# Patient Record
Sex: FEMALE | Race: WHITE | Employment: OTHER | URBAN - METROPOLITAN AREA
[De-identification: names, ages, dates, MRNs, and addresses within clinical notes are randomized per-mention and may not be internally consistent; named-entity substitution may affect disease eponyms.]

---

## 2017-04-10 PROBLEM — I48.91 ATRIAL FIBRILLATION WITH RVR (HCC): Status: ACTIVE | Noted: 2017-04-10

## 2017-04-10 PROBLEM — C78.7 HEPATIC METASTASES (HCC): Status: ACTIVE | Noted: 2017-04-10

## 2017-08-10 ENCOUNTER — HOSPITAL ENCOUNTER (OUTPATIENT)
Dept: ULTRASOUND IMAGING | Age: 82
Discharge: HOME | End: 2017-08-10
Attending: INTERNAL MEDICINE | Admitting: INTERNAL MEDICINE

## 2017-08-10 DIAGNOSIS — R10.30 LOWER ABDOMINAL PAIN: ICD-10-CM

## 2017-08-10 DIAGNOSIS — R10.9 ABDOMINAL PAIN, UNSPECIFIED LOCATION: ICD-10-CM

## 2017-08-23 ENCOUNTER — HOSPITAL ENCOUNTER (OUTPATIENT)
Dept: OTHER | Age: 82
Discharge: HOME | End: 2017-08-23
Attending: INTERNAL MEDICINE | Admitting: INTERNAL MEDICINE

## 2017-08-23 LAB
ALBUMIN SERPL-MCNC: 2.6 G/DL (ref 3.4–5)
ALP BLD-CCNC: 97 U/L (ref 40–129)
ALT SERPL-CCNC: 7 U/L (ref 10–40)
ANION GAP SERPL CALCULATED.3IONS-SCNC: 16 MMOL/L (ref 3–16)
AST SERPL-CCNC: 9 U/L (ref 15–37)
BILIRUB SERPL-MCNC: 0.4 MG/DL (ref 0–1)
BILIRUBIN DIRECT: <0.2 MG/DL (ref 0–0.3)
BILIRUBIN, INDIRECT: ABNORMAL MG/DL (ref 0–1)
BUN BLDV-MCNC: 18 MG/DL (ref 7–20)
CALCIUM SERPL-MCNC: 9.4 MG/DL (ref 8.3–10.6)
CHLORIDE BLD-SCNC: 95 MMOL/L (ref 99–110)
CO2: 22 MMOL/L (ref 21–32)
CREAT SERPL-MCNC: 0.9 MG/DL (ref 0.6–1.2)
GFR AFRICAN AMERICAN: >60
GFR NON-AFRICAN AMERICAN: 59
GLUCOSE BLD-MCNC: 248 MG/DL (ref 70–99)
POTASSIUM SERPL-SCNC: 4.5 MMOL/L (ref 3.5–5.1)
SODIUM BLD-SCNC: 133 MMOL/L (ref 136–145)
TOTAL PROTEIN: 7 G/DL (ref 6.4–8.2)

## 2017-08-31 ENCOUNTER — HOSPITAL ENCOUNTER (OUTPATIENT)
Dept: CT IMAGING | Age: 82
Discharge: HOME | End: 2017-08-31
Attending: INTERNAL MEDICINE | Admitting: INTERNAL MEDICINE

## 2017-08-31 DIAGNOSIS — R10.11 ABDOMINAL PAIN, RIGHT UPPER QUADRANT: ICD-10-CM

## 2017-08-31 DIAGNOSIS — R10.11 RIGHT UPPER QUADRANT PAIN: ICD-10-CM

## 2018-05-22 PROBLEM — I48.91 ATRIAL FIBRILLATION (HCC): Status: ACTIVE | Noted: 2018-05-22

## 2018-05-24 PROBLEM — I47.1 MULTIFOCAL ATRIAL TACHYCARDIA (HCC): Status: ACTIVE | Noted: 2018-05-24

## 2018-05-24 PROBLEM — I48.0 PAROXYSMAL ATRIAL FIBRILLATION (HCC): Status: ACTIVE | Noted: 2018-05-24

## 2018-05-24 PROBLEM — E87.5 HYPERKALEMIA: Status: ACTIVE | Noted: 2018-05-24

## 2018-05-24 PROBLEM — I10 ESSENTIAL HYPERTENSION: Status: ACTIVE | Noted: 2018-05-24

## 2018-05-24 LAB
LEFT VENTRICULAR EJECTION FRACTION HIGH VALUE: 50 %
LEFT VENTRICULAR EJECTION FRACTION MODE: NORMAL
LV EF: 45 %

## 2018-06-08 ENCOUNTER — TELEPHONE (OUTPATIENT)
Dept: CARDIOLOGY CLINIC | Age: 83
End: 2018-06-08

## 2018-06-08 PROBLEM — I50.43 ACUTE ON CHRONIC COMBINED SYSTOLIC AND DIASTOLIC CHF, NYHA CLASS 3 (HCC): Status: ACTIVE | Noted: 2018-06-08

## 2018-06-08 PROBLEM — I50.33 ACUTE ON CHRONIC DIASTOLIC CHF (CONGESTIVE HEART FAILURE), NYHA CLASS 3 (HCC): Status: ACTIVE | Noted: 2018-06-08

## 2018-06-08 PROBLEM — I48.91 ATRIAL FIBRILLATION WITH RVR (HCC): Chronic | Status: ACTIVE | Noted: 2018-06-08

## 2018-06-11 PROBLEM — G47.33 OSA ON CPAP: Status: ACTIVE | Noted: 2018-06-11

## 2018-06-11 PROBLEM — J96.01 ACUTE RESPIRATORY FAILURE WITH HYPOXEMIA (HCC): Status: ACTIVE | Noted: 2018-06-11

## 2018-06-11 PROBLEM — Z99.89 OSA ON CPAP: Status: ACTIVE | Noted: 2018-06-11

## 2018-06-11 PROBLEM — J84.9 ILD (INTERSTITIAL LUNG DISEASE) (HCC): Status: ACTIVE | Noted: 2018-06-11

## 2018-06-11 PROBLEM — I27.20 PULMONARY HTN (HCC): Status: ACTIVE | Noted: 2018-06-11

## 2018-06-16 ENCOUNTER — CARE COORDINATION (OUTPATIENT)
Dept: CASE MANAGEMENT | Age: 83
End: 2018-06-16

## 2018-06-18 ENCOUNTER — CARE COORDINATION (OUTPATIENT)
Dept: CASE MANAGEMENT | Age: 83
End: 2018-06-18

## 2018-06-19 ENCOUNTER — OFFICE VISIT (OUTPATIENT)
Dept: INTERNAL MEDICINE CLINIC | Age: 83
End: 2018-06-19

## 2018-06-19 ENCOUNTER — TELEPHONE (OUTPATIENT)
Dept: INTERNAL MEDICINE CLINIC | Age: 83
End: 2018-06-19

## 2018-06-19 VITALS — HEART RATE: 57 BPM | DIASTOLIC BLOOD PRESSURE: 80 MMHG | OXYGEN SATURATION: 92 % | SYSTOLIC BLOOD PRESSURE: 124 MMHG

## 2018-06-19 DIAGNOSIS — G47.33 OSA ON CPAP: ICD-10-CM

## 2018-06-19 DIAGNOSIS — G89.29 CHRONIC BILATERAL LOW BACK PAIN WITHOUT SCIATICA: ICD-10-CM

## 2018-06-19 DIAGNOSIS — Z79.4 TYPE 2 DIABETES MELLITUS WITH HYPERGLYCEMIA, WITH LONG-TERM CURRENT USE OF INSULIN (HCC): ICD-10-CM

## 2018-06-19 DIAGNOSIS — Z99.89 OSA ON CPAP: ICD-10-CM

## 2018-06-19 DIAGNOSIS — I50.43 ACUTE ON CHRONIC COMBINED SYSTOLIC AND DIASTOLIC CHF, NYHA CLASS 3 (HCC): ICD-10-CM

## 2018-06-19 DIAGNOSIS — J84.9 ILD (INTERSTITIAL LUNG DISEASE) (HCC): ICD-10-CM

## 2018-06-19 DIAGNOSIS — M54.50 CHRONIC BILATERAL LOW BACK PAIN WITHOUT SCIATICA: ICD-10-CM

## 2018-06-19 DIAGNOSIS — E11.65 TYPE 2 DIABETES MELLITUS WITH HYPERGLYCEMIA, WITH LONG-TERM CURRENT USE OF INSULIN (HCC): ICD-10-CM

## 2018-06-19 DIAGNOSIS — I48.19 PERSISTENT ATRIAL FIBRILLATION (HCC): Primary | ICD-10-CM

## 2018-06-19 DIAGNOSIS — I10 ESSENTIAL HYPERTENSION: ICD-10-CM

## 2018-06-19 PROCEDURE — G8427 DOCREV CUR MEDS BY ELIG CLIN: HCPCS | Performed by: INTERNAL MEDICINE

## 2018-06-19 PROCEDURE — 1111F DSCHRG MED/CURRENT MED MERGE: CPT | Performed by: INTERNAL MEDICINE

## 2018-06-19 PROCEDURE — 4040F PNEUMOC VAC/ADMIN/RCVD: CPT | Performed by: INTERNAL MEDICINE

## 2018-06-19 PROCEDURE — G8510 SCR DEP NEG, NO PLAN REQD: HCPCS | Performed by: INTERNAL MEDICINE

## 2018-06-19 PROCEDURE — 99204 OFFICE O/P NEW MOD 45 MIN: CPT | Performed by: INTERNAL MEDICINE

## 2018-06-19 PROCEDURE — G8417 CALC BMI ABV UP PARAM F/U: HCPCS | Performed by: INTERNAL MEDICINE

## 2018-06-19 PROCEDURE — 1123F ACP DISCUSS/DSCN MKR DOCD: CPT | Performed by: INTERNAL MEDICINE

## 2018-06-19 PROCEDURE — 1036F TOBACCO NON-USER: CPT | Performed by: INTERNAL MEDICINE

## 2018-06-19 PROCEDURE — 1090F PRES/ABSN URINE INCON ASSESS: CPT | Performed by: INTERNAL MEDICINE

## 2018-06-19 ASSESSMENT — ENCOUNTER SYMPTOMS
ABDOMINAL DISTENTION: 0
NAUSEA: 0
DIARRHEA: 0
VOMITING: 0
SHORTNESS OF BREATH: 1
CHEST TIGHTNESS: 0
CONSTIPATION: 0
COUGH: 0
WHEEZING: 0
BACK PAIN: 0

## 2018-06-19 ASSESSMENT — PATIENT HEALTH QUESTIONNAIRE - PHQ9
SUM OF ALL RESPONSES TO PHQ QUESTIONS 1-9: 0
2. FEELING DOWN, DEPRESSED OR HOPELESS: 0
SUM OF ALL RESPONSES TO PHQ9 QUESTIONS 1 & 2: 0
1. LITTLE INTEREST OR PLEASURE IN DOING THINGS: 0

## 2018-06-20 ENCOUNTER — CARE COORDINATION (OUTPATIENT)
Dept: CASE MANAGEMENT | Age: 83
End: 2018-06-20

## 2018-06-22 ENCOUNTER — HOSPITAL ENCOUNTER (OUTPATIENT)
Dept: OTHER | Age: 83
Discharge: HOME | End: 2018-06-30
Attending: INTERNAL MEDICINE | Admitting: INTERNAL MEDICINE

## 2018-06-22 ENCOUNTER — TELEPHONE (OUTPATIENT)
Dept: INTERNAL MEDICINE CLINIC | Age: 83
End: 2018-06-22

## 2018-06-22 LAB
ANION GAP SERPL CALCULATED.3IONS-SCNC: 8 MMOL/L (ref 3–16)
BUN BLDV-MCNC: 21 MG/DL (ref 7–20)
CALCIUM SERPL-MCNC: 9.8 MG/DL (ref 8.3–10.6)
CHLORIDE BLD-SCNC: 94 MMOL/L (ref 99–110)
CO2: 36 MMOL/L (ref 21–32)
CREAT SERPL-MCNC: 1.2 MG/DL (ref 0.6–1.2)
GFR AFRICAN AMERICAN: 51
GFR NON-AFRICAN AMERICAN: 42
GLUCOSE BLD-MCNC: 233 MG/DL (ref 70–99)
POTASSIUM SERPL-SCNC: 4.1 MMOL/L (ref 3.5–5.1)
PRO-BNP: 2977 PG/ML (ref 0–449)
SODIUM BLD-SCNC: 138 MMOL/L (ref 136–145)

## 2018-06-25 ENCOUNTER — OFFICE VISIT (OUTPATIENT)
Dept: CARDIOLOGY CLINIC | Age: 83
End: 2018-06-25

## 2018-06-25 VITALS
BODY MASS INDEX: 35.44 KG/M2 | SYSTOLIC BLOOD PRESSURE: 94 MMHG | DIASTOLIC BLOOD PRESSURE: 58 MMHG | HEART RATE: 62 BPM | WEIGHT: 200 LBS | HEIGHT: 63 IN

## 2018-06-25 DIAGNOSIS — I47.1 MULTIFOCAL ATRIAL TACHYCARDIA (HCC): ICD-10-CM

## 2018-06-25 DIAGNOSIS — I10 ESSENTIAL HYPERTENSION: ICD-10-CM

## 2018-06-25 DIAGNOSIS — I50.42 CHRONIC COMBINED SYSTOLIC AND DIASTOLIC CONGESTIVE HEART FAILURE (HCC): ICD-10-CM

## 2018-06-25 DIAGNOSIS — I48.19 PERSISTENT ATRIAL FIBRILLATION (HCC): Primary | ICD-10-CM

## 2018-06-25 PROCEDURE — 99214 OFFICE O/P EST MOD 30 MIN: CPT | Performed by: NURSE PRACTITIONER

## 2018-06-25 PROCEDURE — G8427 DOCREV CUR MEDS BY ELIG CLIN: HCPCS | Performed by: NURSE PRACTITIONER

## 2018-06-25 PROCEDURE — 1111F DSCHRG MED/CURRENT MED MERGE: CPT | Performed by: NURSE PRACTITIONER

## 2018-06-25 PROCEDURE — 1090F PRES/ABSN URINE INCON ASSESS: CPT | Performed by: NURSE PRACTITIONER

## 2018-06-25 PROCEDURE — 1036F TOBACCO NON-USER: CPT | Performed by: NURSE PRACTITIONER

## 2018-06-25 PROCEDURE — 1123F ACP DISCUSS/DSCN MKR DOCD: CPT | Performed by: NURSE PRACTITIONER

## 2018-06-25 PROCEDURE — 4040F PNEUMOC VAC/ADMIN/RCVD: CPT | Performed by: NURSE PRACTITIONER

## 2018-06-25 PROCEDURE — 93000 ELECTROCARDIOGRAM COMPLETE: CPT | Performed by: NURSE PRACTITIONER

## 2018-06-25 PROCEDURE — G8417 CALC BMI ABV UP PARAM F/U: HCPCS | Performed by: NURSE PRACTITIONER

## 2018-06-25 RX ORDER — FUROSEMIDE 40 MG/1
40 TABLET ORAL DAILY
Qty: 60 TABLET | Refills: 0
Start: 2018-06-25 | End: 2018-08-01 | Stop reason: SDUPTHER

## 2018-06-25 RX ORDER — METOPROLOL SUCCINATE 50 MG/1
50 TABLET, EXTENDED RELEASE ORAL DAILY
Qty: 30 TABLET | Refills: 5 | Status: SHIPPED | OUTPATIENT
Start: 2018-06-25 | End: 2018-07-07

## 2018-06-26 ENCOUNTER — CARE COORDINATION (OUTPATIENT)
Dept: CASE MANAGEMENT | Age: 83
End: 2018-06-26

## 2018-06-27 ENCOUNTER — TELEPHONE (OUTPATIENT)
Dept: CARDIOLOGY CLINIC | Age: 83
End: 2018-06-27

## 2018-07-01 ENCOUNTER — HOSPITAL ENCOUNTER (OUTPATIENT)
Dept: OTHER | Age: 83
Discharge: HOME | End: 2018-07-01
Attending: INTERNAL MEDICINE | Admitting: INTERNAL MEDICINE

## 2018-07-02 RX ORDER — DILTIAZEM HYDROCHLORIDE 240 MG/1
240 CAPSULE, COATED, EXTENDED RELEASE ORAL DAILY
Qty: 30 CAPSULE | Refills: 0 | Status: SHIPPED | OUTPATIENT
Start: 2018-07-02 | End: 2018-07-03 | Stop reason: SDUPTHER

## 2018-07-02 RX ORDER — DIGOXIN 125 MCG
125 TABLET ORAL DAILY
Qty: 30 TABLET | Refills: 0 | Status: SHIPPED | OUTPATIENT
Start: 2018-07-02 | End: 2018-07-03 | Stop reason: SDUPTHER

## 2018-07-03 ENCOUNTER — TELEPHONE (OUTPATIENT)
Dept: INTERNAL MEDICINE CLINIC | Age: 83
End: 2018-07-03

## 2018-07-03 RX ORDER — DIGOXIN 125 MCG
125 TABLET ORAL DAILY
Qty: 30 TABLET | Refills: 0 | Status: SHIPPED | OUTPATIENT
Start: 2018-07-03 | End: 2018-07-07

## 2018-07-03 RX ORDER — DILTIAZEM HYDROCHLORIDE 240 MG/1
240 CAPSULE, COATED, EXTENDED RELEASE ORAL DAILY
Qty: 30 CAPSULE | Refills: 0 | Status: ON HOLD | OUTPATIENT
Start: 2018-07-03 | End: 2018-07-10 | Stop reason: SDUPTHER

## 2018-07-03 NOTE — TELEPHONE ENCOUNTER
Last 3 Rx filled were sent to [facility] pharmacy in error - [facility]  (they were prescribed while patient was in the hospital. )   Please re-send.  Xarelto, Cardizem and digoxin

## 2018-07-06 ENCOUNTER — TELEPHONE (OUTPATIENT)
Dept: CARDIOLOGY CLINIC | Age: 83
End: 2018-07-06

## 2018-07-06 NOTE — TELEPHONE ENCOUNTER
Shanice Vilchis in house right now and will draw requested labs. They have been drawing BNP and BMP weekly (last results in [facility] 6/29/18). Should the patient continue to hold Digoxin until resulted? I also informed daughter she should speak to PCP about possible UTI. Voiced understanding to all.

## 2018-07-07 ENCOUNTER — TELEPHONE (OUTPATIENT)
Dept: OTHER | Facility: CLINIC | Age: 83
End: 2018-07-07

## 2018-07-07 PROBLEM — J81.0 ACUTE PULMONARY EDEMA (HCC): Status: ACTIVE | Noted: 2018-07-07

## 2018-07-07 PROBLEM — I50.1 PULMONARY EDEMA CARDIAC CAUSE (HCC): Status: ACTIVE | Noted: 2018-07-07

## 2018-07-07 NOTE — TELEPHONE ENCOUNTER
Writer contacted MD Alma Mendoza ,ED provider to inform of 30 day readmission risk. ED provider informed writer admit or discharge has not been determined but probably will admit. Continue to follow-up.

## 2018-07-09 ENCOUNTER — TELEPHONE (OUTPATIENT)
Dept: INTERNAL MEDICINE CLINIC | Age: 83
End: 2018-07-09

## 2018-07-10 ENCOUNTER — TELEPHONE (OUTPATIENT)
Dept: PULMONOLOGY | Age: 83
End: 2018-07-10

## 2018-07-10 ENCOUNTER — TELEPHONE (OUTPATIENT)
Dept: INTERNAL MEDICINE CLINIC | Age: 83
End: 2018-07-10

## 2018-07-10 NOTE — TELEPHONE ENCOUNTER
Refill request for CARTIA XT  medication.      Name of Pharmacy- [address] [place] [address]      Last visit - 6/19/18     Pending visit - 7/23/18    Last refill -7/3/18      Medication Contract signed -   Last Oarrs ran-         Additional Comments

## 2018-07-10 NOTE — TELEPHONE ENCOUNTER
[facility] [place] calling to have Dr. Loy Dyer sign 3 orders for [facility] and fax to her.     Fax:  [phone]    Phone:  [phone]

## 2018-07-11 ENCOUNTER — TELEPHONE (OUTPATIENT)
Dept: INTERNAL MEDICINE CLINIC | Age: 83
End: 2018-07-11

## 2018-07-11 ENCOUNTER — TELEPHONE (OUTPATIENT)
Dept: OTHER | Facility: CLINIC | Age: 83
End: 2018-07-11

## 2018-07-11 PROBLEM — N39.0 UTI (URINARY TRACT INFECTION): Status: ACTIVE | Noted: 2018-07-11

## 2018-07-11 RX ORDER — DILTIAZEM HYDROCHLORIDE 240 MG/1
240 CAPSULE, EXTENDED RELEASE ORAL DAILY
Qty: 30 CAPSULE | Refills: 0 | Status: SHIPPED | OUTPATIENT
Start: 2018-07-11 | End: 2018-10-06 | Stop reason: SDUPTHER

## 2018-07-11 NOTE — TELEPHONE ENCOUNTER
Handler calling about the order to be signed by Dr. Germain Ragsdale on this patient. Request was made yesterday and may have been faxed to her office; however, they are having fax machine issues. Can this be faxed again, if necessary. Fax:  [phone].

## 2018-07-11 NOTE — TELEPHONE ENCOUNTER
Writer contacted Dr. Nani Diaz ,ED provider to inform of 30 day readmission risk. Dr. Nani Diaz states that pt will be readmitted.

## 2018-07-12 PROBLEM — E11.9 DM (DIABETES MELLITUS) (HCC): Status: ACTIVE | Noted: 2018-07-12

## 2018-07-12 PROBLEM — R41.82 ALTERED MENTAL STATUS: Status: ACTIVE | Noted: 2018-07-12

## 2018-07-14 ENCOUNTER — CARE COORDINATION (OUTPATIENT)
Dept: CASE MANAGEMENT | Age: 83
End: 2018-07-14

## 2018-07-14 DIAGNOSIS — I47.1 MULTIFOCAL ATRIAL TACHYCARDIA (HCC): Primary | ICD-10-CM

## 2018-07-14 PROCEDURE — 1111F DSCHRG MED/CURRENT MED MERGE: CPT

## 2018-07-18 ENCOUNTER — CARE COORDINATION (OUTPATIENT)
Dept: CASE MANAGEMENT | Age: 83
End: 2018-07-18

## 2018-07-23 ENCOUNTER — OFFICE VISIT (OUTPATIENT)
Dept: INTERNAL MEDICINE CLINIC | Age: 83
End: 2018-07-23

## 2018-07-23 VITALS
WEIGHT: 196 LBS | BODY MASS INDEX: 34.72 KG/M2 | HEART RATE: 82 BPM | SYSTOLIC BLOOD PRESSURE: 124 MMHG | DIASTOLIC BLOOD PRESSURE: 80 MMHG | OXYGEN SATURATION: 90 %

## 2018-07-23 DIAGNOSIS — I48.19 PERSISTENT ATRIAL FIBRILLATION (HCC): ICD-10-CM

## 2018-07-23 DIAGNOSIS — E11.9 TYPE 2 DIABETES MELLITUS WITHOUT COMPLICATION, WITH LONG-TERM CURRENT USE OF INSULIN (HCC): ICD-10-CM

## 2018-07-23 DIAGNOSIS — Z79.4 TYPE 2 DIABETES MELLITUS WITHOUT COMPLICATION, WITH LONG-TERM CURRENT USE OF INSULIN (HCC): ICD-10-CM

## 2018-07-23 DIAGNOSIS — J84.9 ILD (INTERSTITIAL LUNG DISEASE) (HCC): ICD-10-CM

## 2018-07-23 DIAGNOSIS — R41.82 ALTERED MENTAL STATUS, UNSPECIFIED ALTERED MENTAL STATUS TYPE: ICD-10-CM

## 2018-07-23 DIAGNOSIS — N30.00 ACUTE CYSTITIS WITHOUT HEMATURIA: Primary | ICD-10-CM

## 2018-07-23 LAB
BILIRUBIN, POC: NORMAL
BLOOD URINE, POC: NORMAL
CLARITY, POC: CLEAR
COLOR, POC: YELLOW
GLUCOSE URINE, POC: NORMAL
KETONES, POC: NORMAL
LEUKOCYTE EST, POC: NORMAL
NITRITE, POC: NORMAL
PH, POC: 5
PROTEIN, POC: NORMAL
SPECIFIC GRAVITY, POC: 1.01
UROBILINOGEN, POC: 0.2

## 2018-07-23 PROCEDURE — 81002 URINALYSIS NONAUTO W/O SCOPE: CPT | Performed by: INTERNAL MEDICINE

## 2018-07-23 PROCEDURE — 1111F DSCHRG MED/CURRENT MED MERGE: CPT | Performed by: INTERNAL MEDICINE

## 2018-07-23 PROCEDURE — 99495 TRANSJ CARE MGMT MOD F2F 14D: CPT | Performed by: INTERNAL MEDICINE

## 2018-07-23 NOTE — PROGRESS NOTES
Subjective:      Patient ID: Sherley Hector is a 80 y.o. female.     HPI    Review of Systems    Objective:   Physical Exam    Assessment:            Plan:               Post-Discharge Transitional Care Management Services      Sherley Hector   YOB: 1930    Date of Office Visit:  7/23/2018  Date of Hospital Admission: 7/11/18  Date of Hospital Discharge: 7/13/18  [facility] Risk Score [risk of hospital readmission >=10  medium risk (chance of readmission ~ 12%) >14  high risk (chance of readmission ~18%)]:Readmission Risk Score: 28    Care management risk score Rising risk (score 2-5) and Complex Care (Scores >=6): 10       Patient Active Problem List   Diagnosis    Multifocal atrial tachycardia ([facility]    Hyperkalemia    Essential hypertension    Chronic combined systolic and diastolic congestive heart failure (HCC)    Persistent atrial fibrillation (HCC)    Dyspnea and respiratory abnormalities    Acute respiratory failure with hypoxemia (HCC)    ILD (interstitial lung disease) ([address]    Pulmonary HTN    KATIANA on CPAP    Hyperglycemia    Hypoxia    Acute pulmonary edema (HCC)    UTI (urinary tract infection)    Altered mental status    DM (diabetes mellitus) ([address]       No Known Allergies    Medications listed as ordered at the time of discharge from [facility] Medication Instructions PAIGE:    Printed on:07/23/18 1018   Medication Information                      allopurinol (ZYLOPRIM) 100 MG tablet  Take 100 mg by mouth daily             CALCIUM PO  Take by mouth             CARTIA  MG extended release capsule  TAKE 1 CAPSULE BY MOUTH DAILY             CPAP Machine MISC  by Does not apply route nightly             furosemide (LASIX) 40 MG tablet  Take 1 tablet by mouth daily             Handicap Placard MISC  by Does not apply route Unable to walk distance, expiration 5 years             LANTUS SOLOSTAR 100 UNIT/ML injection pen  Inject 5 Units into

## 2018-07-24 ENCOUNTER — CARE COORDINATION (OUTPATIENT)
Dept: CASE MANAGEMENT | Age: 83
End: 2018-07-24

## 2018-07-24 NOTE — CARE COORDINATION
[facility] Transitions Follow Up Call    2018    Patient: Luma Ellsworth  Patient : 1930   MRN: [id number]  Reason for Admission: UTI  Discharge Date: 18 RARS: Readmission Risk Score: 28       Attempted to reach patient via phone for care transition, no answer. VM left stating purpose of call along with my contact information requesting a return call.             Follow Up  Future Appointments  Date Time Provider Natalio Bueno   2018 11:00 AM PAM Scott - CNP Santiago Hector [facility]   2018 11:20 AM Fam Alan MD AND PULM [facility]   2019 10:15 AM MD JOHANNA Adame AND LILY Beckford RN

## 2018-07-25 NOTE — CARE COORDINATION
[facility] Transitions Follow Up Call    2018    Patient: Jose Manuel Young  Patient : 1930   MRN: [id number]  Reason for Admission: UTI   Discharge Date: 18 RARS: Readmission Risk Score: 29       Spoke with: patient's daughter     Care Transitions Subsequent and Final Call    Subsequent and Final Calls  Do you have any ongoing symptoms?:  No  Have your medications changed?:  No  Do you have any questions related to your medications?:  No  Do you currently have any active services?:  Yes  Are you currently active with any services?:  Home Health  Do you have any needs or concerns that I can assist you with?:  No  Identified Barriers:  None  Care Transitions Interventions     Other Services:  Declined (Comment: SNF)    Palliative Care:  Completed     Other Interventions:        Spoke with patient's daughter who stated patient is doing very well. Per daughter [facility] made final visit today and has discharge patient. Patient's appetite has improved and she is getting around well. Patient had apt with PCP on Monday and everything went well. Patient's daughter denied any further needs at this time. CTC informed her of final call. Reminded patient that if they have any questions/concerns at anytime, they can always utilize CTC or call PCP/specialist as they have an MD on call . Advised patient that they can always contact their home care provider to request a nurse visit. Educated patient that they can have a home care nurse visit even if it isn't their already scheduled day.        Follow Up  Future Appointments  Date Time Provider Natalio Bueno   2018 11:00 AM PAM Holder - KEILY SPENCER   2018 11:20 AM Mima Machaod MD AND PULM [facility]   2019 10:15 AM Александр Briones MD [facility] AND LILY Hubbard, RN

## 2018-08-01 ENCOUNTER — TELEPHONE (OUTPATIENT)
Dept: INTERNAL MEDICINE CLINIC | Age: 83
End: 2018-08-01

## 2018-08-01 ENCOUNTER — OFFICE VISIT (OUTPATIENT)
Dept: CARDIOLOGY CLINIC | Age: 83
End: 2018-08-01

## 2018-08-01 ENCOUNTER — HOSPITAL ENCOUNTER (OUTPATIENT)
Age: 83
Discharge: HOME | End: 2018-08-01
Payer: MEDICARE

## 2018-08-01 VITALS
HEART RATE: 75 BPM | SYSTOLIC BLOOD PRESSURE: 88 MMHG | WEIGHT: 196.38 LBS | DIASTOLIC BLOOD PRESSURE: 50 MMHG | BODY MASS INDEX: 36.14 KG/M2 | OXYGEN SATURATION: 93 % | HEIGHT: 62 IN

## 2018-08-01 DIAGNOSIS — I48.19 PERSISTENT ATRIAL FIBRILLATION (HCC): ICD-10-CM

## 2018-08-01 DIAGNOSIS — L29.9 ITCHING: ICD-10-CM

## 2018-08-01 DIAGNOSIS — J96.11 CHRONIC RESPIRATORY FAILURE WITH HYPOXIA (HCC): ICD-10-CM

## 2018-08-01 DIAGNOSIS — I50.42 CHRONIC COMBINED SYSTOLIC AND DIASTOLIC CONGESTIVE HEART FAILURE (HCC): ICD-10-CM

## 2018-08-01 DIAGNOSIS — K92.1 MELENA: ICD-10-CM

## 2018-08-01 DIAGNOSIS — I50.42 CHRONIC COMBINED SYSTOLIC AND DIASTOLIC CONGESTIVE HEART FAILURE (HCC): Primary | ICD-10-CM

## 2018-08-01 DIAGNOSIS — I10 ESSENTIAL HYPERTENSION: ICD-10-CM

## 2018-08-01 LAB
ANION GAP SERPL CALCULATED.3IONS-SCNC: 11 MMOL/L (ref 3–16)
BUN BLDV-MCNC: 12 MG/DL (ref 7–20)
CALCIUM SERPL-MCNC: 9.5 MG/DL (ref 8.3–10.6)
CHLORIDE BLD-SCNC: 98 MMOL/L (ref 99–110)
CO2: 30 MMOL/L (ref 21–32)
CREAT SERPL-MCNC: 1.3 MG/DL (ref 0.6–1.2)
GFR AFRICAN AMERICAN: 47
GFR NON-AFRICAN AMERICAN: 39
GLUCOSE BLD-MCNC: 128 MG/DL (ref 70–99)
HCT VFR BLD CALC: 37.2 % (ref 36–48)
HEMOGLOBIN: 11.9 G/DL (ref 12–16)
MCH RBC QN AUTO: 26.5 PG (ref 26–34)
MCHC RBC AUTO-ENTMCNC: 32 G/DL (ref 31–36)
MCV RBC AUTO: 82.7 FL (ref 80–100)
PDW BLD-RTO: 16.6 % (ref 12.4–15.4)
PLATELET # BLD: 324 K/UL (ref 135–450)
PMV BLD AUTO: 9 FL (ref 5–10.5)
POTASSIUM SERPL-SCNC: 4.4 MMOL/L (ref 3.5–5.1)
RBC # BLD: 4.49 M/UL (ref 4–5.2)
SODIUM BLD-SCNC: 139 MMOL/L (ref 136–145)
WBC # BLD: 13.9 K/UL (ref 4–11)

## 2018-08-01 PROCEDURE — 36415 COLL VENOUS BLD VENIPUNCTURE: CPT

## 2018-08-01 PROCEDURE — 80048 BASIC METABOLIC PNL TOTAL CA: CPT

## 2018-08-01 PROCEDURE — 1101F PT FALLS ASSESS-DOCD LE1/YR: CPT | Performed by: NURSE PRACTITIONER

## 2018-08-01 PROCEDURE — 85027 COMPLETE CBC AUTOMATED: CPT

## 2018-08-01 PROCEDURE — 1111F DSCHRG MED/CURRENT MED MERGE: CPT | Performed by: NURSE PRACTITIONER

## 2018-08-01 PROCEDURE — 1036F TOBACCO NON-USER: CPT | Performed by: NURSE PRACTITIONER

## 2018-08-01 PROCEDURE — 1123F ACP DISCUSS/DSCN MKR DOCD: CPT | Performed by: NURSE PRACTITIONER

## 2018-08-01 PROCEDURE — G8417 CALC BMI ABV UP PARAM F/U: HCPCS | Performed by: NURSE PRACTITIONER

## 2018-08-01 PROCEDURE — 1090F PRES/ABSN URINE INCON ASSESS: CPT | Performed by: NURSE PRACTITIONER

## 2018-08-01 PROCEDURE — G8427 DOCREV CUR MEDS BY ELIG CLIN: HCPCS | Performed by: NURSE PRACTITIONER

## 2018-08-01 PROCEDURE — 99214 OFFICE O/P EST MOD 30 MIN: CPT | Performed by: NURSE PRACTITIONER

## 2018-08-01 PROCEDURE — 4040F PNEUMOC VAC/ADMIN/RCVD: CPT | Performed by: NURSE PRACTITIONER

## 2018-08-01 RX ORDER — FUROSEMIDE 40 MG/1
20 TABLET ORAL DAILY
Qty: 60 TABLET | Refills: 0
Start: 2018-08-01 | End: 2019-10-21 | Stop reason: SDUPTHER

## 2018-08-01 NOTE — PATIENT INSTRUCTIONS
Plan:   1. Check CBC and BMP; monitor stool  2. Decrease lasix 20mg daily   3. Continue daily weights; if able to. If not able to do daily weights then check a weekly weight. 4. If your weight goes above 199lbs at home then increase your lasix to 40mg, recheck your weight the next day, if weight is below 199lbs then continue 20mg lasix daily. 5. No other medication changes. 6. Follow up with EP service to discuss Watchman candidacy and anticoagulation  7.  Follow up 10-12 weeks

## 2018-08-01 NOTE — PROGRESS NOTES
[address]   Cardiac Follow-up    Primary Care Doctor:  [facility], [place]    Chief Complaint   Patient presents with    Congestive Heart Failure    Atrial Fibrillation    Other     Itching, nervous         History of Present Illness:   I had the pleasure of seeing [place] Prabhakar in follow up for heart failure. SHe has a history of atrial fibrillation, atrial tachycardia, MAT, chronic combined systolic and diastolic CHF, HTN, COPD, and chronic back pain. Echo in 5/2018 showed an EF of 45-50%. She was admitted in 6/2018 with SOB and swelling and was treated for CHF and afib with RVR. She was discharged on 6/15/2018 in rate controlled afib with diltiazem, digoxin and Metoprolol and her weight was 204 pounds (down from 230 pounds). Last visit, with EP, lopressor was changed to Toprol and lasix was decreased to 40mg daily. Patient continued with disturbed sleep and confusion and BB was held which did not help with symptoms. Digoxin was then held on 7/6/18 due to concerns about side effects. Admitted 7/7/18-7/11/18 with altered mental status. Admitted 7/11/18-7/11/18 returned to ED due to changes in mental status, and was diagnosed with UTI. Since discharge Main complaint is itching. Daughter is concerned that the xarelto is causing her to itch. No rash but terrible itching. Not sleeping at night. Breathing is improved. Some dizziness with position changes. No chest pain. No palpitations. Taking lasix daily. Remains off of the BB. B/p is running low. Reports dark tarry stools on occasion. Home weights: 196lbs      Sebastian Prabhakar describes symptoms including fatigue but denies chest pain, palpitations, orthopnea, early saiety, edema, syncope. NYHA:   II  ACC/ AHA Stage:    B    Past Medical History:   has a past medical history of COPD (chronic obstructive pulmonary disease) ([address] Diabetes mellitus ([address] Gout; Hyperlipidemia; Hypertension; and Paroxysmal a-fib ([address]   Surgical History:   has a past surgical history that includes Hysterectomy. Social History:   reports that she has never smoked. She has never used smokeless tobacco. She reports that she does not drink alcohol or use drugs. Family History:   No family history on file. Home Medications:  Prior to Admission medications    Medication Sig Start Date End Date Taking? Authorizing Provider   CARTIA  MG extended release capsule TAKE 1 CAPSULE BY MOUTH DAILY 7/11/18  Yes Darius Zhao MD   losartan (COZAAR) 25 MG tablet Take 1 tablet by mouth daily 7/11/18  Yes Ivette Weaver MD   rivaroxaban (XARELTO) 15 MG TABS tablet Take 1 tablet by mouth daily (with breakfast)  Patient taking differently: Take 15 mg by mouth daily (with breakfast)  7/3/18  Yes Darius Zhao MD   furosemide (LASIX) 40 MG tablet Take 1 tablet by mouth daily 6/25/18  Yes Sampson Hunt APRN - CNP   LANTUS SOLOSTAR 100 UNIT/ML injection pen Inject 5 Units into the skin daily 6/15/18  Yes Miley Otero MD   lidocaine (LIDODERM) 5 % Place 1 patch onto the skin daily 12 hours on, 12 hours off. 5/26/18  Yes Ivette Weaver MD   CALCIUM PO Take by mouth   Yes Historical Provider, MD   oxybutynin (DITROPAN) 5 MG tablet TK 1 T PO QD 5/15/18  Yes Historical Provider, MD   levalbuterol Cam Kristofer) 1.25 MG/3ML nebulizer solution USE 1 NEBULE QID PRN 5/15/18  Yes Historical Provider, MD   CPAP Machine MISC by Does not apply route nightly   Yes Historical Provider, MD   allopurinol (ZYLOPRIM) 100 MG tablet Take 100 mg by mouth daily   Yes Historical Provider, MD   Handicap Placard MISC by Does not apply route Unable to walk distance, expiration 5 years 6/19/18   Darius Zhao MD        Allergies:  Patient has no known allergies. Review of Systems:   · Constitutional: there has been no unanticipated weight loss. · Eyes: No vision changes  · ENT: No Headaches, no nasal congestion. No mouth sores or sore throat.   · Cardiovascular: Reviewed function is mildly reduced. Systolic pulmonic artery pressure (SPAP) is estimated at mmHg 46 consistent with mild pulmonary hypertension (Right atrial pressure of 8 mmHg). Echo 4/11/2017:  Normal left ventricular systolic function with an estimated ejection fraction of 55%. There is mild concentric left ventricular hypertrophy. Elevated left ventricular diastolic filling pressure. The right ventricle is moderately enlarged. The right atrium is moderately dilated. Mild mitral annular calcification. .   Moderate tricuspid regurgitation. Systolic pulmonary artery pressure (SPAP) is estimated at 64 mmHg consistent with severe pulmonary hypertension (RA pressure 3 mmHg). Pertinent Problems:  ~ Persistent Atrial fibrillation, Atrial tach and MAT   - [id number] Score for Atrial Fibrillation Stroke risk 6  ~ chronic combined CHF- appears compensated  ~Cardiomyopathy, LVEF 45-50%   - possible tachycardia mediated  ~ chronic respiratory failure   - has underlying ILD, pulmonary edema and effusions contributing  ~HTN- now with hypotension  ~COPD/ILD     Visit Diagnosis:    1. Chronic combined systolic and diastolic congestive heart failure (HCC)    2. Persistent atrial fibrillation ([address]    3. Melena    4. Essential hypertension    5. Chronic respiratory failure with hypoxia (HCC)    6. Itching          Plan:   1. Check CBC to evaluate for anemia given characteristics of stool and check BMP to evaluate for renal failure given the generalized itching. monitor stool  2. Decrease lasix 20mg daily   3. Continue daily weights; if able to. If not able to do daily weights then check a weekly weight. 4. If your weight goes above 199lbs at home then increase your lasix to 40mg, recheck your weight the next day, if weight is below 199lbs then continue 20mg lasix daily. 5. No other medication changes today; unsure if the itching is related to the xarelto.  She has tried and failed Eliquis due to side effects in the past. Not able to be regulated on coumadin. If itching continues recommended follow up with PCP  6. Follow up with EP service to discuss Watchman candidacy and anticoagulation alternatives  7. Follow up 10-12 weeks      QUALITY MEASURES  1. Tobacco Cessation Counseling: NA  2. Retake of BP if >140/90:   NA  3. Documentation to PCP/referring for new patient:  Sent to PCP at close of office visit  4. CAD patient on anti-platelet: NA  5. CAD patient on STATIN therapy:  NA  6. Patient with CHF and aFib on anticoagulation:  Yes     I appreciate the opportunity for caring for this patient.      Amari Martins CNP, 8/1/2018, 11:00 AM

## 2018-08-03 ENCOUNTER — TELEPHONE (OUTPATIENT)
Dept: CARDIOLOGY CLINIC | Age: 83
End: 2018-08-03

## 2018-08-08 DIAGNOSIS — L29.9 ITCHING: Primary | ICD-10-CM

## 2018-08-08 RX ORDER — CLORAZEPATE DIPOTASSIUM 3.75 MG/1
3.75 TABLET ORAL 2 TIMES DAILY
Qty: 60 TABLET | Refills: 0 | Status: SHIPPED | OUTPATIENT
Start: 2018-08-08 | End: 2018-08-09 | Stop reason: SDUPTHER

## 2018-08-08 NOTE — TELEPHONE ENCOUNTER
Russ called,Jasmyne really needs refill on Clorazepate. It keeps  Her from itching and settles her down. Her # [phone].

## 2018-08-09 ENCOUNTER — TELEPHONE (OUTPATIENT)
Dept: INTERNAL MEDICINE CLINIC | Age: 83
End: 2018-08-09

## 2018-08-09 DIAGNOSIS — L29.9 ITCHING: ICD-10-CM

## 2018-08-09 RX ORDER — CLORAZEPATE DIPOTASSIUM 3.75 MG/1
3.75 TABLET ORAL 2 TIMES DAILY
Qty: 60 TABLET | Refills: 0 | Status: SHIPPED | OUTPATIENT
Start: 2018-08-09 | End: 2018-09-08

## 2018-08-09 NOTE — TELEPHONE ENCOUNTER
Rx for clorazepate ws sent to the wrong pharmacy - please resend to [place] in [place]. Patient is out and needs this today.

## 2018-08-11 PROBLEM — N39.0 UTI (URINARY TRACT INFECTION): Status: RESOLVED | Noted: 2018-07-11 | Resolved: 2018-08-11

## 2018-08-16 ENCOUNTER — TELEPHONE (OUTPATIENT)
Dept: PULMONOLOGY | Age: 83
End: 2018-08-16

## 2018-08-16 ENCOUNTER — OFFICE VISIT (OUTPATIENT)
Dept: PULMONOLOGY | Age: 83
End: 2018-08-16

## 2018-08-16 VITALS
HEART RATE: 69 BPM | OXYGEN SATURATION: 98 % | WEIGHT: 194 LBS | TEMPERATURE: 98.6 F | BODY MASS INDEX: 35.7 KG/M2 | DIASTOLIC BLOOD PRESSURE: 48 MMHG | HEIGHT: 62 IN | RESPIRATION RATE: 16 BRPM | SYSTOLIC BLOOD PRESSURE: 132 MMHG

## 2018-08-16 DIAGNOSIS — I48.19 PERSISTENT ATRIAL FIBRILLATION (HCC): ICD-10-CM

## 2018-08-16 DIAGNOSIS — I50.42 CHRONIC COMBINED SYSTOLIC AND DIASTOLIC CONGESTIVE HEART FAILURE (HCC): ICD-10-CM

## 2018-08-16 DIAGNOSIS — Z99.89 OSA ON CPAP: Primary | ICD-10-CM

## 2018-08-16 DIAGNOSIS — J44.9 CHRONIC OBSTRUCTIVE PULMONARY DISEASE, UNSPECIFIED COPD TYPE (HCC): ICD-10-CM

## 2018-08-16 DIAGNOSIS — I27.20 PULMONARY HTN (HCC): ICD-10-CM

## 2018-08-16 DIAGNOSIS — R09.02 HYPOXIA: ICD-10-CM

## 2018-08-16 DIAGNOSIS — G47.33 OSA ON CPAP: Primary | ICD-10-CM

## 2018-08-16 PROCEDURE — 1090F PRES/ABSN URINE INCON ASSESS: CPT | Performed by: INTERNAL MEDICINE

## 2018-08-16 PROCEDURE — G8926 SPIRO NO PERF OR DOC: HCPCS | Performed by: INTERNAL MEDICINE

## 2018-08-16 PROCEDURE — 99214 OFFICE O/P EST MOD 30 MIN: CPT | Performed by: INTERNAL MEDICINE

## 2018-08-16 PROCEDURE — 4040F PNEUMOC VAC/ADMIN/RCVD: CPT | Performed by: INTERNAL MEDICINE

## 2018-08-16 PROCEDURE — 1101F PT FALLS ASSESS-DOCD LE1/YR: CPT | Performed by: INTERNAL MEDICINE

## 2018-08-16 PROCEDURE — 3023F SPIROM DOC REV: CPT | Performed by: INTERNAL MEDICINE

## 2018-08-16 PROCEDURE — G8427 DOCREV CUR MEDS BY ELIG CLIN: HCPCS | Performed by: INTERNAL MEDICINE

## 2018-08-16 PROCEDURE — 1123F ACP DISCUSS/DSCN MKR DOCD: CPT | Performed by: INTERNAL MEDICINE

## 2018-08-16 PROCEDURE — 1036F TOBACCO NON-USER: CPT | Performed by: INTERNAL MEDICINE

## 2018-08-16 PROCEDURE — G8417 CALC BMI ABV UP PARAM F/U: HCPCS | Performed by: INTERNAL MEDICINE

## 2018-08-16 RX ORDER — LEVALBUTEROL TARTRATE 45 UG/1
2 AEROSOL, METERED ORAL EVERY 4 HOURS PRN
Qty: 1 INHALER | Refills: 3 | Status: SHIPPED | OUTPATIENT
Start: 2018-08-16 | End: 2018-08-16 | Stop reason: SDUPTHER

## 2018-08-16 ASSESSMENT — SLEEP AND FATIGUE QUESTIONNAIRES
HOW LIKELY ARE YOU TO NOD OFF OR FALL ASLEEP WHILE SITTING AND TALKING TO SOMEONE: 0
HOW LIKELY ARE YOU TO NOD OFF OR FALL ASLEEP WHILE SITTING AND READING: 0
HOW LIKELY ARE YOU TO NOD OFF OR FALL ASLEEP IN A CAR, WHILE STOPPED FOR A FEW MINUTES IN TRAFFIC: 0
HOW LIKELY ARE YOU TO NOD OFF OR FALL ASLEEP WHEN YOU ARE A PASSENGER IN A CAR FOR AN HOUR WITHOUT A BREAK: 0
ESS TOTAL SCORE: 3
HOW LIKELY ARE YOU TO NOD OFF OR FALL ASLEEP WHILE WATCHING TV: 0
HOW LIKELY ARE YOU TO NOD OFF OR FALL ASLEEP WHILE SITTING QUIETLY AFTER LUNCH WITHOUT ALCOHOL: 0
HOW LIKELY ARE YOU TO NOD OFF OR FALL ASLEEP WHILE LYING DOWN TO REST IN THE AFTERNOON WHEN CIRCUMSTANCES PERMIT: 3
HOW LIKELY ARE YOU TO NOD OFF OR FALL ASLEEP WHILE SITTING INACTIVE IN A PUBLIC PLACE: 0
NECK CIRCUMFERENCE (INCHES): 15

## 2018-08-16 NOTE — TELEPHONE ENCOUNTER
Monik requesting a change from [employer] to [employer] or [employer]. Xopenex requires a PA  Please advise  Dr Willi Camargo requesting Liborio Esquivel d/t dx of A Fib.    levalbuterol (XOPENEX HFA) 45 MCG/ACT inhaler; Inhale 2 puffs into the lungs every 4 hours as needed for Wheezing  Dispense: 1 Inhaler; Refill: 3    Assessment:     1. KATIANA on CPAP        2. Pulmonary HTN ([address]        3. Hypoxia        4. Chronic combined systolic and diastolic congestive heart failure (HCC)     - levalbuterol (XOPENEX HFA) 45 MCG/ACT inhaler; Inhale 2 puffs into the lungs every 4 hours as needed for Wheezing  Dispense: 1 Inhaler; Refill: 3     5. Chronic obstructive pulmonary disease, unspecified COPD type (HCC)     - levalbuterol (XOPENEX HFA) 45 MCG/ACT inhaler; Inhale 2 puffs into the lungs every 4 hours as needed for Wheezing  Dispense: 1 Inhaler; Refill: 3     6.  Persistent atrial fibrillation [facility])              Plan:   · Patient and family were told about the auscultative findings and implications  · Patient was shown the x-ray chest done in July and compared that one  with the one done in June  · Patient was told about the lab work-PATIENT'S RENAL FUNCTION HAS WORSENED WITH LASIX AS COMPARED TO FEW WEEKS BACK AND PATIENT HAS SLIGHTLY WORSENING LEUKOCYTOSIS AS COMPARED TO THE PREVIOUS ONE   · Patient's PCP and cardiology to decide upon the dose of lasix and rpt BMP and may need Rpt CBC   · Patient was told about the Echo results and implications   · Patient's PFT done in 2015 was reviewed with the patient and family along with implications  · Patient's diffusing capacity when adjusted for volume is out of proportion to the obstructive airway disease and patient does not have any history of disease on that PFT; patient may have a combination of ILD which needs to be kept in mind and reassessed if patient is not improving or has recurrence of symptomatology  · Would not subject the patient to HRCT of the chest at this time  · Patient was told about salt and fluid restrictions  · Patient to take Xopenex nebulization on whenever necessary basis  · Patient was given Xopenex HFA inhaler and was told to take 2 puffs every 6 on whenever necessary basis and was taught how to take it properly  · Patient was also told her Xopenex inhaler made not be totally covered by her insurance and may have to pay more  · Patient to continue with diuresis as given by cardiology but does dozing as per cardiology teaching reregistered on the basis of clinical status and the BMP  · No need of any antibiotics or prednisone from pulmonary standpoint of view per patient has worsening leukocytosis which needs to be evaluated and managed by PCP  · Patient to continue with her CPAP as before and it is working and she is compliant  · Patient to continue with oxygen supplementation to keep saturation between 90-94% only  · Patient needs to continue losing weight  · May consider repeat PFT in the future if patient is still symptomatic  · Patient is up-to-date with Prevnar 13 vaccine  · Would be able to get Pneumovax in May 2019  · Patient's further treatment depending on patient's clinical status and the follow-up on above recommendations  · Patient to follow-up in 4 months time or earlier if required

## 2018-08-16 NOTE — PROGRESS NOTES
CC-Posthospitalization pulmonary follow up      Presenting HPI: Patient is a 27-year-old female who was admitted to the hospital with acute respiratory insufficiency and was found to have congestive heart failure with pulmonary edema along with that patient had UTI and there was a question that patient may have ILD, patient was given diuresis along with antibiotics and supportive care and patient was discharged home but patient had to be readmitted with a similar issue of UTI and shortness of breath and was found to have pulmonary edema once again and was treated for the same,  says that she was in a bad shape per month when these symptoms happen but over the course of time she has recovered, patient has some cough with mucoid expectoration once in a while, patient does not have any increasing shortness of breath, patient is able to do her ADLs without any worsening shortness of breath, patient does not have any chest pain or palpitations at this time, patient does not have any increasing rhinorrhea or nasal congestion, patient does not have any difficulty in swallowing, no coughing or choking or eating, patient does not have any otalgia or your discharge, patient does not have any pleuritic chest pain, patient still has some leg swelling but 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, patient is on diuretics on a regular basis, patient checks her weights on an everyday basis and is trying not to gain any weight, patient does not have any abdominal discomfort and nausea vomiting, patient does not have any dysuria or hematuria now, patient is using her CPAP machine on a regular basis, patient does not have any small joint pains or swelling of the knuckles, patient does not have any significant confusion or lethargy, patient does not have any change in ambient environment,patient has xopenex with nebulizer which she uses once a while but no rescue inhaler  no other pertinent review of system of concern 10:48   Ref. Range 7/13/2018 06:57 7/13/2018 07:57 7/13/2018 11:51 8/1/2018 12:05   Sodium Latest Ref Range: 136 - 145 mmol/L 138   139   Potassium Latest Ref Range: 3.5 - 5.1 mmol/L 3.9   4.4   Chloride Latest Ref Range: 99 - 110 mmol/L 99   98 (L)   CO2 Latest Ref Range: 21 - 32 mmol/L 29   30   BUN Latest Ref Range: 7 - 20 mg/dL 12   12   Creatinine Latest Ref Range: 0.6 - 1.2 mg/dL 1.0   1.3 (H)   Anion Gap Latest Ref Range: 3 - 16  10   11   GFR Non- Latest Ref Range: >60  52 (A)   39 (A)   GFR  Latest Ref Range: >60  >60   47 (A)   Glucose Latest Ref Range: 70 - 99 mg/dL 86   128 (H)   POC Glucose Latest Ref Range: 70 - 99 mg/dl  81 92    Calcium Latest Ref Range: 8.3 - 10.6 mg/dL 7.8 (L)   9.5     Results for Jerson Cole (MRN [id number]) as of 8/16/2018 10:48   Ref.  Range 7/7/2018 12:42 7/11/2018 13:59 7/12/2018 06:45 7/13/2018 06:57 8/1/2018 12:05   WBC Latest Ref Range: 4.0 - 11.0 K/uL 12.3 (H) 15.1 (H) 12.3 (H) 12.7 (H) 13.9 (H)   RBC Latest Ref Range: 4.00 - 5.20 M/uL 5.20 5.22 (H) 5.09 4.96 4.49   Hemoglobin Quant Latest Ref Range: 12.0 - 16.0 g/dL 13.7 13.7 13.4 13.0 11.9 (L)   Hematocrit Latest Ref Range: 36.0 - 48.0 % 41.8 41.9 41.3 39.8 37.2   MCV Latest Ref Range: 80.0 - 100.0 fL 80.3 80.2 81.0 80.2 82.7   MCH Latest Ref Range: 26.0 - 34.0 pg 26.4 26.2 26.4 26.2 26.5   MCHC Latest Ref Range: 31.0 - 36.0 g/dL 32.9 32.6 32.6 32.7 32.0   MPV Latest Ref Range: 5.0 - 10.5 fL 8.6 8.5 8.4 8.2 9.0   RDW Latest Ref Range: 12.4 - 15.4 % 15.9 (H) 16.2 (H) 16.2 (H) 16.2 (H) 16.6 (H)   Platelet Count Latest Ref Range: 135 - 450 K/uL 249 273 246 219 324   Neutrophils % Latest Units: % 77.6 82.2        Urine Culture, Routine 07/11/2018  4:00 PM [address] Lab   Organism  (Abnormal) 07/11/2018  4:00 PM [facility]   Escherichia coli     Urine Culture, Routine 07/11/2018  4:00 PM [facility]   >100,000 CFU/ml    Testing Performed By     Lab - adjustment of the   mA/kV was utilized to reduce the radiation dose to as low as reasonably   achievable.       COMPARISON:   CT head earlier today.       HISTORY:   ORDERING PHYSICIAN PROVIDED HISTORY: stroke; ORDERING PHYSICIAN PROVIDED   HISTORY: stroke   TECHNOLOGIST PROVIDED HISTORY:   Technologist Provided Reason for Exam: poss CVA   sudden onset of slurred   speech and AMS. Acuity: Acute   Type of Encounter: Initial   Relevant Medical/Surgical History: HX HTN and diabetes       FINDINGS:   CTA NECK:       AORTIC ARCH/ARCH VESSELS: Mild atherosclerotic plaque at the aortic arch   without aneurysm or dissection.  Moderate calcific atherosclerotic stenosis   of the proximal right subclavian artery.  No stenosis of the innominate or   left subclavian arteries.       CAROTID ARTERIES: Mild calcified atherosclerotic plaque at the carotid   bifurcations and involving the carotid bulbs without significant stenosis. No carotid arterial dissection, pseudoaneurysm, or thrombosis.       VERTEBRAL ARTERIES: Dominant left vertebral artery.  Mild stenosis at the   origin of the left vertebral artery.  No right vertebral artery stenosis.  No   vertebral artery dissection, pseudoaneurysm, or thrombosis.       SOFT TISSUES: Mild centrilobular emphysema in the visualized upper lungs. Right upper lobe ground-glass opacity with interlobular septal thickening. Coronary artery calcifications noted.  No abnormality of the neck soft   tissues.       BONES: There is no acute fracture or suspect osseous lesion.  Mild C5-6   degenerative disc disease.       CTA HEAD:       ANTERIOR CIRCULATION: The internal carotid arteries are normal in course and   caliber without focal stenosis. The anterior cerebral and middle cerebral   arteries demonstrate no focal stenosis.       POSTERIOR CIRCULATION: A right posterior communicating artery is present.    There is mild stenosis of the intracranial right vertebral artery.  No left

## 2018-08-16 NOTE — LETTER
[facility]  [address] [place] [id number]  Phone: [phone]  Fax: [phone]    Bryan Baker MD        August 16, 2018       Patient: Elzbieta Phan   MR Number: [id number]   YOB: 1930   Date of Visit: 8/16/2018       Dear Dr. Ace Jenkins:    . Below are the relevant portions of my assessment and plan of care. If you have questions, please do not hesitate to call me. I look forward to following Katt Hoyt along with you.     Sincerely,        Suleman Christie MD    CC providers:  Makenna Zaragoza MD  [address] [place] [address]   [facility] [address] [address] [place] [address] [facility], APRN - [phone] [facility]  VIA In Basket

## 2018-08-17 RX ORDER — LEVALBUTEROL TARTRATE 45 UG/1
2 AEROSOL, METERED ORAL EVERY 4 HOURS PRN
Qty: 75 G | Refills: 3 | Status: SHIPPED | OUTPATIENT
Start: 2018-08-17 | End: 2020-01-01 | Stop reason: CLARIF

## 2018-09-07 ENCOUNTER — OFFICE VISIT (OUTPATIENT)
Dept: CARDIOLOGY CLINIC | Age: 83
End: 2018-09-07

## 2018-09-07 VITALS
WEIGHT: 193 LBS | BODY MASS INDEX: 35.51 KG/M2 | DIASTOLIC BLOOD PRESSURE: 56 MMHG | HEART RATE: 84 BPM | SYSTOLIC BLOOD PRESSURE: 128 MMHG | HEIGHT: 62 IN

## 2018-09-07 DIAGNOSIS — I48.19 PERSISTENT ATRIAL FIBRILLATION (HCC): Primary | ICD-10-CM

## 2018-09-07 PROCEDURE — G8427 DOCREV CUR MEDS BY ELIG CLIN: HCPCS | Performed by: INTERNAL MEDICINE

## 2018-09-07 PROCEDURE — 1090F PRES/ABSN URINE INCON ASSESS: CPT | Performed by: INTERNAL MEDICINE

## 2018-09-07 PROCEDURE — G8417 CALC BMI ABV UP PARAM F/U: HCPCS | Performed by: INTERNAL MEDICINE

## 2018-09-07 PROCEDURE — 1101F PT FALLS ASSESS-DOCD LE1/YR: CPT | Performed by: INTERNAL MEDICINE

## 2018-09-07 PROCEDURE — 93000 ELECTROCARDIOGRAM COMPLETE: CPT | Performed by: INTERNAL MEDICINE

## 2018-09-07 PROCEDURE — 99214 OFFICE O/P EST MOD 30 MIN: CPT | Performed by: INTERNAL MEDICINE

## 2018-09-07 NOTE — PROGRESS NOTES
without delay or bruit  · Peripheral pulses are symmetrical and full  Abdomen:  · No masses or tenderness  · Bowel sounds present  Extremities:  ·  No Cyanosis or Clubbing  ·  Lower extremity edema: No  · Skin: Warm and dry  Neurological:  · Alert and oriented. · Moves all extremities well  · No abnormalities of mood, affect, memory, mentation, or behavior are noted      DATA:    ECG: SR 83  ECHO: 5/2018  Summary   Atrial fibrillations with rapid ventricular response. Definity contrast is   used. Mild anterolateral wall hypokinesia. EF estimated 45-50%. Normal left ventricular size with moderate concentric left ventricular   hypertrophy. Left ventricular diastolic filling pressure is elevated. Moderate mitral annular calcification. No mitral stenosis. Mild mitral regurgitation. Left atrium is of normal size. Aortic valve appears sclerotic but opens adequately. Mild aortic valve regurgitation. Normal right ventricular size. Right ventricular systolic function is mildly reduced. Systolic pulmonic artery pressure (SPAP) is estimated at mmHg 46 consistent   with mild pulmonary hypertension (Right atrial pressure of 8 mmHg). IMPRESSION:    1. Persistent atrial fibrillation (HCC)      Unable to take Coumadin, Eliquis and now issues with Xarelto    KEW1PT5-NCKw Score 6, CHF, HTN, Age, DM, Gender. RECOMMENDATIONS:  1. Try taking Xarelto in the evening or morning with a meal. Do not take on an empty stomach. 2. Try making this change with your Xarelto and see if this improves your symptoms. 3. Call our office Monday to report whether or not your symptoms have improved. 4. Referral to Rheumatology for polymyalgia and need for steroids  5. Do not feel Watchman procedure should be pursued at this time until you make change in how you take Xarelto. 6. Follow up with Oxana Herrera CNP as planned. QUALITY MEASURES  1. Tobacco Cessation Counseling: NA  2. Retake of BP if >140/90:   NA  3.

## 2018-09-07 NOTE — PATIENT INSTRUCTIONS
RECOMMENDATIONS:  1. Try taking Xarelto in the evening or morning with a meal. Do not take on an empty stomach. 2. Try making this change with your Xarelto and see if this improves your symptoms. 3. Call our office Monday to report whether or not your symptoms have improved. 4. Referral to Rheumatology. 5. Do not feel Watchman procedure should be pursued at this time until you make change in how you take Xarelto. 6. Follow up with Niyah Rosado CNP as planned.

## 2018-09-11 ENCOUNTER — TELEPHONE (OUTPATIENT)
Dept: CARDIOLOGY CLINIC | Age: 83
End: 2018-09-11

## 2018-09-17 ENCOUNTER — TELEPHONE (OUTPATIENT)
Dept: INTERNAL MEDICINE CLINIC | Age: 83
End: 2018-09-17

## 2018-09-17 DIAGNOSIS — F41.9 ANXIETY: Primary | ICD-10-CM

## 2018-09-17 RX ORDER — CLORAZEPATE DIPOTASSIUM 7.5 MG/1
7.5 TABLET ORAL NIGHTLY
Qty: 90 TABLET | Refills: 0 | Status: SHIPPED | OUTPATIENT
Start: 2018-09-17 | End: 2018-09-19 | Stop reason: SDUPTHER

## 2018-09-17 NOTE — TELEPHONE ENCOUNTER
Patient has been taking Tranxene 3.75 mg bid  to help keep itching down she has from another medication she's taking.  (possibly blood thinner), and it has been working well. Daughter Ronnie Dennis asks if this could be changed to 7.5 tablets to take once a day . This would help her mother by not having to remember to take it twice a day, and only at bedtime. Please advise. [phone]  Refill request for tranzene medication. Name of [facility]     Last visit - 7/23/18     Pending visit -     Last refill -8/9/18    Medication Contract signed -6/19/18  Last Oarrs ran- 6/19/18    Additional Comments     Change to 7.5 mg once daily if that's acceptable.

## 2018-09-18 RX ORDER — BLOOD-GLUCOSE METER
1 KIT MISCELLANEOUS DAILY
Qty: 1 KIT | Refills: 0 | Status: SHIPPED | OUTPATIENT
Start: 2018-09-18

## 2018-09-19 ENCOUNTER — TELEPHONE (OUTPATIENT)
Dept: INTERNAL MEDICINE CLINIC | Age: 83
End: 2018-09-19

## 2018-09-19 DIAGNOSIS — F41.9 ANXIETY: ICD-10-CM

## 2018-09-19 RX ORDER — CLORAZEPATE DIPOTASSIUM 7.5 MG/1
7.5 TABLET ORAL NIGHTLY
Qty: 90 TABLET | Refills: 0 | Status: SHIPPED | OUTPATIENT
Start: 2018-09-19 | End: 2018-12-31 | Stop reason: SDUPTHER

## 2018-09-19 NOTE — TELEPHONE ENCOUNTER
RX for clorazepate was sent to wrong pharmacy. I called [facility] outpatient pharmacy and told them not to fill this, but a new Rx needs to go to [facility] in [address]

## 2018-10-06 RX ORDER — DILTIAZEM HYDROCHLORIDE 240 MG/1
240 CAPSULE, EXTENDED RELEASE ORAL DAILY
Qty: 30 CAPSULE | Refills: 0 | Status: SHIPPED | OUTPATIENT
Start: 2018-10-06 | End: 2018-11-03 | Stop reason: SDUPTHER

## 2018-10-29 ENCOUNTER — OFFICE VISIT (OUTPATIENT)
Dept: RHEUMATOLOGY | Age: 83
End: 2018-10-29
Payer: MEDICARE

## 2018-10-29 VITALS
WEIGHT: 194 LBS | HEART RATE: 80 BPM | HEIGHT: 62 IN | BODY MASS INDEX: 35.7 KG/M2 | DIASTOLIC BLOOD PRESSURE: 78 MMHG | TEMPERATURE: 98.4 F | SYSTOLIC BLOOD PRESSURE: 122 MMHG

## 2018-10-29 DIAGNOSIS — M15.9 GENERALIZED OSTEOARTHRITIS: Primary | ICD-10-CM

## 2018-10-29 DIAGNOSIS — M25.511 CHRONIC RIGHT SHOULDER PAIN: ICD-10-CM

## 2018-10-29 DIAGNOSIS — G89.29 CHRONIC RIGHT SHOULDER PAIN: ICD-10-CM

## 2018-10-29 PROCEDURE — 1101F PT FALLS ASSESS-DOCD LE1/YR: CPT | Performed by: INTERNAL MEDICINE

## 2018-10-29 PROCEDURE — G8484 FLU IMMUNIZE NO ADMIN: HCPCS | Performed by: INTERNAL MEDICINE

## 2018-10-29 PROCEDURE — 99204 OFFICE O/P NEW MOD 45 MIN: CPT | Performed by: INTERNAL MEDICINE

## 2018-10-29 PROCEDURE — G8427 DOCREV CUR MEDS BY ELIG CLIN: HCPCS | Performed by: INTERNAL MEDICINE

## 2018-10-29 PROCEDURE — 1036F TOBACCO NON-USER: CPT | Performed by: INTERNAL MEDICINE

## 2018-10-29 PROCEDURE — G8417 CALC BMI ABV UP PARAM F/U: HCPCS | Performed by: INTERNAL MEDICINE

## 2018-10-29 PROCEDURE — 1090F PRES/ABSN URINE INCON ASSESS: CPT | Performed by: INTERNAL MEDICINE

## 2018-10-29 PROCEDURE — 4040F PNEUMOC VAC/ADMIN/RCVD: CPT | Performed by: INTERNAL MEDICINE

## 2018-10-29 PROCEDURE — 1123F ACP DISCUSS/DSCN MKR DOCD: CPT | Performed by: INTERNAL MEDICINE

## 2018-10-29 NOTE — PROGRESS NOTES
p.r.n. Patient indicates understanding and agrees with the management plan. I reviewed patient's history, referral documents and electronic medical records-from different providers including PCP, pulmonary, cardiology. Copy of consult note is being routed electronically/faxed to referring physician. #######################################################################    REASON FOR CONSULTATION:  Patient is being seen at the request of  Elisa Thompson MD / Sina Graves MD for evaluation of right shoulder pain. HISTORY OF PRESENT ILLNESS:  80 y.o. female with multiple medical problems including respiratory failure needing continuous oxygen ( Dr Timbo Arevalo from 8/16/18- COPD,  ?ILD, KATIANA in CPAP), atrial fibrillation on anticoagulation, congestive heart failure, states that her right shoulder has been bothering her since in her mid 35s. Early last winter, she was sore everywhere including shoulders, hips, and she could barely move, and her primary care provider started on 15 mg of prednisone which helped with her symptoms significantly however, continued to have discomfort in her right shoulder. She remained on 15 mg of prednisone for about 6 months. When she was diagnosed with atrial fibrillation, prednisone was stopped. Since then, she has noticed that right shoulder has gotten significantly worse, painful to move, associated with cracking sensation. At times, it wakes her up from sleep. No associated swelling. Left shoulder also bothers her, however has full range of motion, and is able to deal with mild discomfort. Hips are asymptomatic. All other peripheral joint side doing well. She has had several intra-articular steroid injections in her right shoulder without much help. She has been using icy hot and Aspercreme, that helps to ease up some pain. She wonders if she can take Celebrex or prednisone which might possibly help.   Denies any history of psoriasis or inflammatory bowel levalbuterol (XOPENEX) 1.25 MG/3ML nebulizer solution USE 1 NEBULE QID PRN  5    CPAP Machine MISC by Does not apply route nightly      allopurinol (ZYLOPRIM) 100 MG tablet Take 100 mg by mouth daily       No current facility-administered medications for this visit. No Known Allergies    PHYSICAL EXAM:    Vitals:    /78 (Site: Left Upper Arm, Position: Sitting, Cuff Size: Medium Adult)   Pulse 80   Temp 98.4 °F (36.9 °C) (Oral)   Ht 5' 2\" (1.575 m)   Wt 194 lb (88 kg)   BMI 35.48 kg/m²   General appearance/ Psychiatric: well nourished, and well groomed, normal judgement, alert, appears stated age and cooperative. MKS:   Right shoulder-normal inspection, not warm to touch, no focal tenderness, range of motion-limited by 50% in all directions, associated with bony crepitus, no joint effusion. Left shoulder-full range of motion with bony crepitus. All other joints in upper and lower extremities are nontender, no swelling or synovitis, range of motion preserved. No focal spine tenderness. Skin: No rashes, no induration or skin thickening or nodules. No evidence ischemia or deformities noted in digits or nails. HEENT: Normal lids, lacrimal glands and pupils. No oral or nasal ulcers. Salivary glands reveal no evidence of abnormality. External inspection of the ears and nose within normal limits. Neck: No masses or asymmetry. No thyroid enlargement. Chest: Normal effort, clear to auscultation. Heart:  Normal s1/s2,   ANeurologic: normal deep tendon reflexes. No foot or wrist drop.           DATA:   Lab Results   Component Value Date    WBC 13.9 (H) 08/01/2018    HGB 11.9 (L) 08/01/2018    HCT 37.2 08/01/2018    MCV 82.7 08/01/2018     08/01/2018         Chemistry        Component Value Date/Time     08/01/2018 1205    K 4.4 08/01/2018 1205    K 3.9 07/13/2018 0657    CL 98 (L) 08/01/2018 1205    CO2 30 08/01/2018 1205    BUN 12 08/01/2018 1205    CREATININE 1.3 (H) 08/01/2018 1205

## 2018-11-05 RX ORDER — DILTIAZEM HYDROCHLORIDE 240 MG/1
240 CAPSULE, EXTENDED RELEASE ORAL DAILY
Qty: 30 CAPSULE | Refills: 0 | Status: SHIPPED | OUTPATIENT
Start: 2018-11-05 | End: 2018-12-10 | Stop reason: SDUPTHER

## 2018-12-03 ENCOUNTER — OFFICE VISIT (OUTPATIENT)
Dept: PULMONOLOGY | Age: 83
End: 2018-12-03
Payer: MEDICARE

## 2018-12-03 VITALS
SYSTOLIC BLOOD PRESSURE: 138 MMHG | HEIGHT: 62 IN | TEMPERATURE: 98.1 F | HEART RATE: 88 BPM | OXYGEN SATURATION: 90 % | BODY MASS INDEX: 36.44 KG/M2 | WEIGHT: 198 LBS | DIASTOLIC BLOOD PRESSURE: 59 MMHG | RESPIRATION RATE: 16 BRPM

## 2018-12-03 DIAGNOSIS — G47.33 OSA (OBSTRUCTIVE SLEEP APNEA): Primary | ICD-10-CM

## 2018-12-03 DIAGNOSIS — J96.11 CHRONIC RESPIRATORY FAILURE WITH HYPOXIA (HCC): ICD-10-CM

## 2018-12-03 PROCEDURE — 4040F PNEUMOC VAC/ADMIN/RCVD: CPT | Performed by: INTERNAL MEDICINE

## 2018-12-03 PROCEDURE — G8482 FLU IMMUNIZE ORDER/ADMIN: HCPCS | Performed by: INTERNAL MEDICINE

## 2018-12-03 PROCEDURE — 1123F ACP DISCUSS/DSCN MKR DOCD: CPT | Performed by: INTERNAL MEDICINE

## 2018-12-03 PROCEDURE — 1036F TOBACCO NON-USER: CPT | Performed by: INTERNAL MEDICINE

## 2018-12-03 PROCEDURE — 99213 OFFICE O/P EST LOW 20 MIN: CPT | Performed by: INTERNAL MEDICINE

## 2018-12-03 PROCEDURE — G8427 DOCREV CUR MEDS BY ELIG CLIN: HCPCS | Performed by: INTERNAL MEDICINE

## 2018-12-03 PROCEDURE — 1101F PT FALLS ASSESS-DOCD LE1/YR: CPT | Performed by: INTERNAL MEDICINE

## 2018-12-03 PROCEDURE — G8417 CALC BMI ABV UP PARAM F/U: HCPCS | Performed by: INTERNAL MEDICINE

## 2018-12-03 PROCEDURE — 1090F PRES/ABSN URINE INCON ASSESS: CPT | Performed by: INTERNAL MEDICINE

## 2018-12-03 ASSESSMENT — SLEEP AND FATIGUE QUESTIONNAIRES
ESS TOTAL SCORE: 2
HOW LIKELY ARE YOU TO NOD OFF OR FALL ASLEEP IN A CAR, WHILE STOPPED FOR A FEW MINUTES IN TRAFFIC: 0
HOW LIKELY ARE YOU TO NOD OFF OR FALL ASLEEP WHILE SITTING AND READING: 0
HOW LIKELY ARE YOU TO NOD OFF OR FALL ASLEEP WHILE SITTING AND TALKING TO SOMEONE: 0
HOW LIKELY ARE YOU TO NOD OFF OR FALL ASLEEP WHILE LYING DOWN TO REST IN THE AFTERNOON WHEN CIRCUMSTANCES PERMIT: 2
HOW LIKELY ARE YOU TO NOD OFF OR FALL ASLEEP WHILE WATCHING TV: 0
HOW LIKELY ARE YOU TO NOD OFF OR FALL ASLEEP WHEN YOU ARE A PASSENGER IN A CAR FOR AN HOUR WITHOUT A BREAK: 0
HOW LIKELY ARE YOU TO NOD OFF OR FALL ASLEEP WHILE SITTING INACTIVE IN A PUBLIC PLACE: 0
NECK CIRCUMFERENCE (INCHES): 15
HOW LIKELY ARE YOU TO NOD OFF OR FALL ASLEEP WHILE SITTING QUIETLY AFTER LUNCH WITHOUT ALCOHOL: 0

## 2018-12-09 ASSESSMENT — ENCOUNTER SYMPTOMS: SHORTNESS OF BREATH: 1

## 2018-12-10 RX ORDER — DILTIAZEM HYDROCHLORIDE 240 MG/1
240 CAPSULE, EXTENDED RELEASE ORAL DAILY
Qty: 30 CAPSULE | Refills: 0 | Status: SHIPPED | OUTPATIENT
Start: 2018-12-10 | End: 2019-01-07 | Stop reason: SDUPTHER

## 2018-12-14 ENCOUNTER — OFFICE VISIT (OUTPATIENT)
Dept: PULMONOLOGY | Age: 83
End: 2018-12-14
Payer: MEDICARE

## 2018-12-14 VITALS
HEART RATE: 77 BPM | OXYGEN SATURATION: 90 % | BODY MASS INDEX: 36.07 KG/M2 | DIASTOLIC BLOOD PRESSURE: 62 MMHG | WEIGHT: 196 LBS | SYSTOLIC BLOOD PRESSURE: 146 MMHG | HEIGHT: 62 IN | TEMPERATURE: 97.6 F | RESPIRATION RATE: 16 BRPM

## 2018-12-14 DIAGNOSIS — I50.42 CHRONIC COMBINED SYSTOLIC AND DIASTOLIC CONGESTIVE HEART FAILURE (HCC): ICD-10-CM

## 2018-12-14 DIAGNOSIS — J44.9 CHRONIC OBSTRUCTIVE PULMONARY DISEASE, UNSPECIFIED COPD TYPE (HCC): ICD-10-CM

## 2018-12-14 DIAGNOSIS — J84.9 ILD (INTERSTITIAL LUNG DISEASE) (HCC): Primary | ICD-10-CM

## 2018-12-14 DIAGNOSIS — G47.33 OSA ON CPAP: ICD-10-CM

## 2018-12-14 DIAGNOSIS — I27.20 PULMONARY HTN (HCC): ICD-10-CM

## 2018-12-14 DIAGNOSIS — Z99.89 OSA ON CPAP: ICD-10-CM

## 2018-12-14 PROCEDURE — 1036F TOBACCO NON-USER: CPT | Performed by: INTERNAL MEDICINE

## 2018-12-14 PROCEDURE — 3023F SPIROM DOC REV: CPT | Performed by: INTERNAL MEDICINE

## 2018-12-14 PROCEDURE — 1101F PT FALLS ASSESS-DOCD LE1/YR: CPT | Performed by: INTERNAL MEDICINE

## 2018-12-14 PROCEDURE — G8482 FLU IMMUNIZE ORDER/ADMIN: HCPCS | Performed by: INTERNAL MEDICINE

## 2018-12-14 PROCEDURE — 99213 OFFICE O/P EST LOW 20 MIN: CPT | Performed by: INTERNAL MEDICINE

## 2018-12-14 PROCEDURE — 1090F PRES/ABSN URINE INCON ASSESS: CPT | Performed by: INTERNAL MEDICINE

## 2018-12-14 PROCEDURE — G8926 SPIRO NO PERF OR DOC: HCPCS | Performed by: INTERNAL MEDICINE

## 2018-12-14 PROCEDURE — G8427 DOCREV CUR MEDS BY ELIG CLIN: HCPCS | Performed by: INTERNAL MEDICINE

## 2018-12-14 PROCEDURE — G8417 CALC BMI ABV UP PARAM F/U: HCPCS | Performed by: INTERNAL MEDICINE

## 2018-12-14 PROCEDURE — 4040F PNEUMOC VAC/ADMIN/RCVD: CPT | Performed by: INTERNAL MEDICINE

## 2018-12-14 PROCEDURE — 1123F ACP DISCUSS/DSCN MKR DOCD: CPT | Performed by: INTERNAL MEDICINE

## 2018-12-14 ASSESSMENT — SLEEP AND FATIGUE QUESTIONNAIRES
HOW LIKELY ARE YOU TO NOD OFF OR FALL ASLEEP WHILE SITTING QUIETLY AFTER LUNCH WITHOUT ALCOHOL: 0
HOW LIKELY ARE YOU TO NOD OFF OR FALL ASLEEP WHILE SITTING INACTIVE IN A PUBLIC PLACE: 0
HOW LIKELY ARE YOU TO NOD OFF OR FALL ASLEEP WHILE LYING DOWN TO REST IN THE AFTERNOON WHEN CIRCUMSTANCES PERMIT: 1
HOW LIKELY ARE YOU TO NOD OFF OR FALL ASLEEP IN A CAR, WHILE STOPPED FOR A FEW MINUTES IN TRAFFIC: 0
NECK CIRCUMFERENCE (INCHES): 15
ESS TOTAL SCORE: 1
HOW LIKELY ARE YOU TO NOD OFF OR FALL ASLEEP WHILE SITTING AND TALKING TO SOMEONE: 0
HOW LIKELY ARE YOU TO NOD OFF OR FALL ASLEEP WHEN YOU ARE A PASSENGER IN A CAR FOR AN HOUR WITHOUT A BREAK: 0
HOW LIKELY ARE YOU TO NOD OFF OR FALL ASLEEP WHILE SITTING AND READING: 0
HOW LIKELY ARE YOU TO NOD OFF OR FALL ASLEEP WHILE WATCHING TV: 0

## 2018-12-14 NOTE — PROGRESS NOTES
· Patient and family were told about the auscultative findings and implications  · Patient and family were told about the CPAP compliance report  · Patient's PCP and cardiology to decide upon the dose of lasix and rpt BMP and may need Rpt CBC   · Patient's diffusing capacity when adjusted for volume is out of proportion to the obstructive airway disease and patient does not have any history of disease on that PFT; patient may have a combination of   · Would not subject the patient to HRCT of the chest at this time  · Patient was told about salt and fluid restrictions  · Patient to take Xopenex nebulization on whenever necessary basis  · Patient was given Xopenex HFA inhaler and was told to take 2 puffs every 6 on whenever necessary basis and was taught how to take it properly  · Patient does not need any more bronchodilators at this time  · Patient to continue with diuresis as given by cardiology but does dozing as per cardiology teaching reregistered on the basis of clinical status and the BMP  · No need of any antibiotics or prednisone from pulmonary standpoint of view per patient has worsening leukocytosis which needs to be evaluated and managed by PCP  · Patient to continue with her CPAP as before and it is working and she is compliant  · Patient to continue with oxygen supplementation to keep saturation between 90-94% only  · Office was requested to see if patient's [employer] company can be changed  · Patient is up-to-date with Prevnar 13 vaccine  · Would be able to get Pneumovax in May 2019  · Patient's further treatment depending on patient's clinical status and the follow-up on above recommendations  · Patient to follow-up in3 months time or earlier if required

## 2018-12-31 DIAGNOSIS — F41.9 ANXIETY: ICD-10-CM

## 2018-12-31 RX ORDER — CLORAZEPATE DIPOTASSIUM 7.5 MG/1
7.5 TABLET ORAL NIGHTLY
Qty: 90 TABLET | Refills: 0 | Status: SHIPPED | OUTPATIENT
Start: 2018-12-31 | End: 2019-02-09 | Stop reason: SDUPTHER

## 2018-12-31 NOTE — TELEPHONE ENCOUNTER
Refill request for clorazepate medication.      Name of [facility] visit - 7/23/18     Pending visit - 1/28/19    Last refill -9/19/18  0 refills    Medication Contract signed -9/17/18   Last Oarrs ran- 12/27/18

## 2018-12-31 NOTE — TELEPHONE ENCOUNTER
The family says that she is having some sort of reaction, but not sure if its due to medication, or anxiety, etc... Her pumonary and heart doctor have addressed this issue, and can't find a reason. However, daughter says she itchs badly, and has welts from scratching.   This medication has helped her in the past.

## 2019-01-01 ENCOUNTER — TELEPHONE (OUTPATIENT)
Dept: INTERNAL MEDICINE CLINIC | Age: 84
End: 2019-01-01

## 2019-01-01 RX ORDER — LANCING DEVICE/LANCETS
1 KIT MISCELLANEOUS DAILY
Qty: 1 EACH | Refills: 1 | Status: SHIPPED | OUTPATIENT
Start: 2019-01-01

## 2019-01-01 RX ORDER — FUROSEMIDE 40 MG/1
40 TABLET ORAL DAILY
Qty: 90 TABLET | Refills: 3 | Status: SHIPPED | OUTPATIENT
Start: 2019-01-01 | End: 2020-01-01 | Stop reason: SDUPTHER

## 2019-01-07 RX ORDER — RIVAROXABAN 15 MG/1
TABLET, FILM COATED ORAL
Qty: 30 TABLET | Refills: 0 | Status: SHIPPED | OUTPATIENT
Start: 2019-01-07 | End: 2019-02-09 | Stop reason: SDUPTHER

## 2019-01-07 RX ORDER — DILTIAZEM HYDROCHLORIDE 240 MG/1
240 CAPSULE, EXTENDED RELEASE ORAL DAILY
Qty: 30 CAPSULE | Refills: 0 | Status: SHIPPED | OUTPATIENT
Start: 2019-01-07 | End: 2019-02-09 | Stop reason: SDUPTHER

## 2019-01-10 RX ORDER — ALLOPURINOL 100 MG/1
TABLET ORAL
Qty: 90 TABLET | Refills: 0 | Status: SHIPPED | OUTPATIENT
Start: 2019-01-10 | End: 2019-04-13 | Stop reason: SDUPTHER

## 2019-01-13 RX ORDER — OXYBUTYNIN CHLORIDE 5 MG/1
TABLET ORAL
Qty: 90 TABLET | Refills: 0 | Status: SHIPPED | OUTPATIENT
Start: 2019-01-13 | End: 2019-04-13 | Stop reason: SDUPTHER

## 2019-01-16 RX ORDER — INSULIN GLARGINE 100 [IU]/ML
INJECTION, SOLUTION SUBCUTANEOUS
Qty: 15 ML | Refills: 0 | Status: SHIPPED | OUTPATIENT
Start: 2019-01-16 | End: 2019-03-03 | Stop reason: SDUPTHER

## 2019-02-01 DIAGNOSIS — G47.33 OSA (OBSTRUCTIVE SLEEP APNEA): ICD-10-CM

## 2019-02-01 DIAGNOSIS — J96.11 CHRONIC RESPIRATORY FAILURE WITH HYPOXIA (HCC): ICD-10-CM

## 2019-02-04 ENCOUNTER — OFFICE VISIT (OUTPATIENT)
Dept: INTERNAL MEDICINE CLINIC | Age: 84
End: 2019-02-04
Payer: MEDICARE

## 2019-02-04 VITALS
SYSTOLIC BLOOD PRESSURE: 124 MMHG | OXYGEN SATURATION: 98 % | WEIGHT: 193 LBS | DIASTOLIC BLOOD PRESSURE: 78 MMHG | BODY MASS INDEX: 35.3 KG/M2 | HEART RATE: 74 BPM

## 2019-02-04 DIAGNOSIS — G47.33 OSA ON CPAP: ICD-10-CM

## 2019-02-04 DIAGNOSIS — I48.19 PERSISTENT ATRIAL FIBRILLATION (HCC): ICD-10-CM

## 2019-02-04 DIAGNOSIS — E11.9 TYPE 2 DIABETES MELLITUS WITHOUT COMPLICATION, WITH LONG-TERM CURRENT USE OF INSULIN (HCC): ICD-10-CM

## 2019-02-04 DIAGNOSIS — I10 ESSENTIAL HYPERTENSION: ICD-10-CM

## 2019-02-04 DIAGNOSIS — Z99.89 OSA ON CPAP: ICD-10-CM

## 2019-02-04 DIAGNOSIS — J44.9 CHRONIC OBSTRUCTIVE PULMONARY DISEASE, UNSPECIFIED COPD TYPE (HCC): ICD-10-CM

## 2019-02-04 DIAGNOSIS — Z79.4 TYPE 2 DIABETES MELLITUS WITHOUT COMPLICATION, WITH LONG-TERM CURRENT USE OF INSULIN (HCC): ICD-10-CM

## 2019-02-04 DIAGNOSIS — J84.9 ILD (INTERSTITIAL LUNG DISEASE) (HCC): Primary | ICD-10-CM

## 2019-02-04 PROCEDURE — 99214 OFFICE O/P EST MOD 30 MIN: CPT | Performed by: INTERNAL MEDICINE

## 2019-02-04 PROCEDURE — G8417 CALC BMI ABV UP PARAM F/U: HCPCS | Performed by: INTERNAL MEDICINE

## 2019-02-04 PROCEDURE — 1090F PRES/ABSN URINE INCON ASSESS: CPT | Performed by: INTERNAL MEDICINE

## 2019-02-04 PROCEDURE — 1101F PT FALLS ASSESS-DOCD LE1/YR: CPT | Performed by: INTERNAL MEDICINE

## 2019-02-04 PROCEDURE — G8926 SPIRO NO PERF OR DOC: HCPCS | Performed by: INTERNAL MEDICINE

## 2019-02-04 PROCEDURE — G8427 DOCREV CUR MEDS BY ELIG CLIN: HCPCS | Performed by: INTERNAL MEDICINE

## 2019-02-04 PROCEDURE — 1123F ACP DISCUSS/DSCN MKR DOCD: CPT | Performed by: INTERNAL MEDICINE

## 2019-02-04 PROCEDURE — 1036F TOBACCO NON-USER: CPT | Performed by: INTERNAL MEDICINE

## 2019-02-04 PROCEDURE — 3023F SPIROM DOC REV: CPT | Performed by: INTERNAL MEDICINE

## 2019-02-04 PROCEDURE — G8482 FLU IMMUNIZE ORDER/ADMIN: HCPCS | Performed by: INTERNAL MEDICINE

## 2019-02-04 PROCEDURE — 4040F PNEUMOC VAC/ADMIN/RCVD: CPT | Performed by: INTERNAL MEDICINE

## 2019-02-09 DIAGNOSIS — F41.9 ANXIETY: ICD-10-CM

## 2019-02-12 RX ORDER — RIVAROXABAN 15 MG/1
TABLET, FILM COATED ORAL
Qty: 30 TABLET | Refills: 0 | Status: SHIPPED | OUTPATIENT
Start: 2019-02-12 | End: 2019-03-03 | Stop reason: SDUPTHER

## 2019-02-12 RX ORDER — CLORAZEPATE DIPOTASSIUM 7.5 MG/1
TABLET ORAL
Qty: 90 TABLET | Refills: 0 | Status: SHIPPED | OUTPATIENT
Start: 2019-02-12 | End: 2020-01-01

## 2019-02-12 RX ORDER — DILTIAZEM HYDROCHLORIDE 240 MG/1
240 CAPSULE, EXTENDED RELEASE ORAL DAILY
Qty: 30 CAPSULE | Refills: 0 | Status: SHIPPED | OUTPATIENT
Start: 2019-02-12 | End: 2019-03-03 | Stop reason: SDUPTHER

## 2019-03-03 RX ORDER — RIVAROXABAN 15 MG/1
TABLET, FILM COATED ORAL
Qty: 30 TABLET | Refills: 0 | Status: SHIPPED | OUTPATIENT
Start: 2019-03-03 | End: 2019-03-31 | Stop reason: SDUPTHER

## 2019-03-03 RX ORDER — INSULIN GLARGINE 100 [IU]/ML
INJECTION, SOLUTION SUBCUTANEOUS
Qty: 15 ML | Refills: 0 | Status: SHIPPED | OUTPATIENT
Start: 2019-03-03 | End: 2019-04-13 | Stop reason: SDUPTHER

## 2019-03-03 RX ORDER — DILTIAZEM HYDROCHLORIDE 240 MG/1
CAPSULE, COATED, EXTENDED RELEASE ORAL
Qty: 30 CAPSULE | Refills: 0 | Status: SHIPPED | OUTPATIENT
Start: 2019-03-03 | End: 2019-03-31 | Stop reason: SDUPTHER

## 2019-03-19 ENCOUNTER — OFFICE VISIT (OUTPATIENT)
Dept: PULMONOLOGY | Age: 84
End: 2019-03-19
Payer: MEDICARE

## 2019-03-19 VITALS
SYSTOLIC BLOOD PRESSURE: 139 MMHG | RESPIRATION RATE: 16 BRPM | TEMPERATURE: 97.3 F | DIASTOLIC BLOOD PRESSURE: 72 MMHG | HEIGHT: 62 IN | OXYGEN SATURATION: 100 % | WEIGHT: 196 LBS | BODY MASS INDEX: 36.07 KG/M2 | HEART RATE: 74 BPM

## 2019-03-19 DIAGNOSIS — I27.20 PULMONARY HTN (HCC): ICD-10-CM

## 2019-03-19 DIAGNOSIS — J44.9 CHRONIC OBSTRUCTIVE PULMONARY DISEASE, UNSPECIFIED COPD TYPE (HCC): Primary | ICD-10-CM

## 2019-03-19 DIAGNOSIS — G47.33 OSA ON CPAP: ICD-10-CM

## 2019-03-19 DIAGNOSIS — J84.9 ILD (INTERSTITIAL LUNG DISEASE) (HCC): ICD-10-CM

## 2019-03-19 DIAGNOSIS — Z99.89 OSA ON CPAP: ICD-10-CM

## 2019-03-19 DIAGNOSIS — R09.02 HYPOXIA: ICD-10-CM

## 2019-03-19 PROCEDURE — 4040F PNEUMOC VAC/ADMIN/RCVD: CPT | Performed by: INTERNAL MEDICINE

## 2019-03-19 PROCEDURE — G8482 FLU IMMUNIZE ORDER/ADMIN: HCPCS | Performed by: INTERNAL MEDICINE

## 2019-03-19 PROCEDURE — G8427 DOCREV CUR MEDS BY ELIG CLIN: HCPCS | Performed by: INTERNAL MEDICINE

## 2019-03-19 PROCEDURE — 1090F PRES/ABSN URINE INCON ASSESS: CPT | Performed by: INTERNAL MEDICINE

## 2019-03-19 PROCEDURE — 3023F SPIROM DOC REV: CPT | Performed by: INTERNAL MEDICINE

## 2019-03-19 PROCEDURE — 1123F ACP DISCUSS/DSCN MKR DOCD: CPT | Performed by: INTERNAL MEDICINE

## 2019-03-19 PROCEDURE — 1036F TOBACCO NON-USER: CPT | Performed by: INTERNAL MEDICINE

## 2019-03-19 PROCEDURE — 1101F PT FALLS ASSESS-DOCD LE1/YR: CPT | Performed by: INTERNAL MEDICINE

## 2019-03-19 PROCEDURE — G8926 SPIRO NO PERF OR DOC: HCPCS | Performed by: INTERNAL MEDICINE

## 2019-03-19 PROCEDURE — G8417 CALC BMI ABV UP PARAM F/U: HCPCS | Performed by: INTERNAL MEDICINE

## 2019-03-19 PROCEDURE — 99214 OFFICE O/P EST MOD 30 MIN: CPT | Performed by: INTERNAL MEDICINE

## 2019-03-19 ASSESSMENT — SLEEP AND FATIGUE QUESTIONNAIRES
HOW LIKELY ARE YOU TO NOD OFF OR FALL ASLEEP WHILE SITTING AND TALKING TO SOMEONE: 0
NECK CIRCUMFERENCE (INCHES): 15
HOW LIKELY ARE YOU TO NOD OFF OR FALL ASLEEP WHILE LYING DOWN TO REST IN THE AFTERNOON WHEN CIRCUMSTANCES PERMIT: 3
HOW LIKELY ARE YOU TO NOD OFF OR FALL ASLEEP WHILE SITTING INACTIVE IN A PUBLIC PLACE: 0
HOW LIKELY ARE YOU TO NOD OFF OR FALL ASLEEP WHILE SITTING QUIETLY AFTER LUNCH WITHOUT ALCOHOL: 0
HOW LIKELY ARE YOU TO NOD OFF OR FALL ASLEEP WHILE WATCHING TV: 1
HOW LIKELY ARE YOU TO NOD OFF OR FALL ASLEEP WHEN YOU ARE A PASSENGER IN A CAR FOR AN HOUR WITHOUT A BREAK: 0
HOW LIKELY ARE YOU TO NOD OFF OR FALL ASLEEP WHILE SITTING AND READING: 0
HOW LIKELY ARE YOU TO NOD OFF OR FALL ASLEEP IN A CAR, WHILE STOPPED FOR A FEW MINUTES IN TRAFFIC: 0
ESS TOTAL SCORE: 4

## 2019-04-01 RX ORDER — DILTIAZEM HYDROCHLORIDE 240 MG/1
CAPSULE, COATED, EXTENDED RELEASE ORAL
Qty: 30 CAPSULE | Refills: 0 | Status: SHIPPED | OUTPATIENT
Start: 2019-04-01 | End: 2019-05-11 | Stop reason: SDUPTHER

## 2019-04-01 RX ORDER — RIVAROXABAN 15 MG/1
TABLET, FILM COATED ORAL
Qty: 30 TABLET | Refills: 0 | Status: SHIPPED | OUTPATIENT
Start: 2019-04-01 | End: 2019-05-11 | Stop reason: SDUPTHER

## 2019-04-14 RX ORDER — INSULIN GLARGINE 100 [IU]/ML
INJECTION, SOLUTION SUBCUTANEOUS
Qty: 15 ML | Refills: 0 | Status: SHIPPED | OUTPATIENT
Start: 2019-04-14 | End: 2019-07-01 | Stop reason: SDUPTHER

## 2019-04-14 RX ORDER — ALLOPURINOL 100 MG/1
TABLET ORAL
Qty: 90 TABLET | Refills: 0 | Status: SHIPPED | OUTPATIENT
Start: 2019-04-14 | End: 2019-08-07 | Stop reason: SDUPTHER

## 2019-04-14 RX ORDER — OXYBUTYNIN CHLORIDE 5 MG/1
TABLET ORAL
Qty: 90 TABLET | Refills: 0 | Status: SHIPPED | OUTPATIENT
Start: 2019-04-14 | End: 2019-07-15 | Stop reason: SDUPTHER

## 2019-05-12 RX ORDER — DILTIAZEM HYDROCHLORIDE 240 MG/1
CAPSULE, COATED, EXTENDED RELEASE ORAL
Qty: 30 CAPSULE | Refills: 0 | Status: SHIPPED | OUTPATIENT
Start: 2019-05-12 | End: 2019-06-11 | Stop reason: SDUPTHER

## 2019-05-12 RX ORDER — RIVAROXABAN 15 MG/1
TABLET, FILM COATED ORAL
Qty: 30 TABLET | Refills: 0 | Status: SHIPPED | OUTPATIENT
Start: 2019-05-12 | End: 2019-06-11 | Stop reason: SDUPTHER

## 2019-06-11 RX ORDER — RIVAROXABAN 15 MG/1
TABLET, FILM COATED ORAL
Qty: 30 TABLET | Refills: 3 | Status: SHIPPED | OUTPATIENT
Start: 2019-06-11 | End: 2019-08-05 | Stop reason: SDUPTHER

## 2019-06-11 RX ORDER — DILTIAZEM HYDROCHLORIDE 240 MG/1
CAPSULE, COATED, EXTENDED RELEASE ORAL
Qty: 30 CAPSULE | Refills: 5 | Status: SHIPPED | OUTPATIENT
Start: 2019-06-11 | End: 2019-08-05 | Stop reason: SDUPTHER

## 2019-06-11 RX ORDER — LOSARTAN POTASSIUM 25 MG/1
25 TABLET ORAL DAILY
Qty: 30 TABLET | Refills: 3 | Status: SHIPPED | OUTPATIENT
Start: 2019-06-11 | End: 2019-06-11 | Stop reason: SDUPTHER

## 2019-06-11 RX ORDER — LOSARTAN POTASSIUM 25 MG/1
25 TABLET ORAL DAILY
Qty: 90 TABLET | Refills: 3 | Status: SHIPPED | OUTPATIENT
Start: 2019-06-11 | End: 2019-09-24 | Stop reason: ALTCHOICE

## 2019-06-11 NOTE — TELEPHONE ENCOUNTER
Refill request for losartan  medication.      [facility]    Last visit - 2-4-19     Pending visit - 8-5-19    Last refill -6-11-19    Medication Contract signed -  Kemal randle-     Additional Comments

## 2019-06-11 NOTE — TELEPHONE ENCOUNTER
Refill request for      -   Xarelto 15 MG  -       Diltiazem 240 MG        medication.      Name of [address] [place] [address]  # [id number]    Last visit -02/09/19     Pending visit -08/05/19    Last refill -      05/12/19

## 2019-07-01 RX ORDER — INSULIN GLARGINE 100 [IU]/ML
INJECTION, SOLUTION SUBCUTANEOUS
Qty: 15 ML | Refills: 0 | Status: SHIPPED | OUTPATIENT
Start: 2019-07-01 | End: 2019-09-03 | Stop reason: SDUPTHER

## 2019-07-15 RX ORDER — OXYBUTYNIN CHLORIDE 5 MG/1
5 TABLET ORAL DAILY
Qty: 90 TABLET | Refills: 5 | Status: SHIPPED | OUTPATIENT
Start: 2019-07-15 | End: 2019-08-05 | Stop reason: DRUGHIGH

## 2019-08-05 ENCOUNTER — OFFICE VISIT (OUTPATIENT)
Dept: INTERNAL MEDICINE CLINIC | Age: 84
End: 2019-08-05
Payer: MEDICARE

## 2019-08-05 VITALS
WEIGHT: 197 LBS | DIASTOLIC BLOOD PRESSURE: 60 MMHG | SYSTOLIC BLOOD PRESSURE: 134 MMHG | TEMPERATURE: 97.9 F | BODY MASS INDEX: 36.25 KG/M2 | RESPIRATION RATE: 16 BRPM | HEIGHT: 62 IN

## 2019-08-05 DIAGNOSIS — E11.9 TYPE 2 DIABETES MELLITUS WITHOUT COMPLICATION, WITH LONG-TERM CURRENT USE OF INSULIN (HCC): ICD-10-CM

## 2019-08-05 DIAGNOSIS — F41.9 ANXIETY: ICD-10-CM

## 2019-08-05 DIAGNOSIS — I10 ESSENTIAL HYPERTENSION: Primary | ICD-10-CM

## 2019-08-05 DIAGNOSIS — J84.9 ILD (INTERSTITIAL LUNG DISEASE) (HCC): ICD-10-CM

## 2019-08-05 DIAGNOSIS — Z79.4 TYPE 2 DIABETES MELLITUS WITHOUT COMPLICATION, WITH LONG-TERM CURRENT USE OF INSULIN (HCC): ICD-10-CM

## 2019-08-05 DIAGNOSIS — I48.19 PERSISTENT ATRIAL FIBRILLATION (HCC): ICD-10-CM

## 2019-08-05 DIAGNOSIS — N32.81 OAB (OVERACTIVE BLADDER): ICD-10-CM

## 2019-08-05 PROCEDURE — 1036F TOBACCO NON-USER: CPT | Performed by: INTERNAL MEDICINE

## 2019-08-05 PROCEDURE — 1090F PRES/ABSN URINE INCON ASSESS: CPT | Performed by: INTERNAL MEDICINE

## 2019-08-05 PROCEDURE — 1123F ACP DISCUSS/DSCN MKR DOCD: CPT | Performed by: INTERNAL MEDICINE

## 2019-08-05 PROCEDURE — 99214 OFFICE O/P EST MOD 30 MIN: CPT | Performed by: INTERNAL MEDICINE

## 2019-08-05 PROCEDURE — G8427 DOCREV CUR MEDS BY ELIG CLIN: HCPCS | Performed by: INTERNAL MEDICINE

## 2019-08-05 PROCEDURE — 4040F PNEUMOC VAC/ADMIN/RCVD: CPT | Performed by: INTERNAL MEDICINE

## 2019-08-05 PROCEDURE — G8417 CALC BMI ABV UP PARAM F/U: HCPCS | Performed by: INTERNAL MEDICINE

## 2019-08-05 RX ORDER — DILTIAZEM HYDROCHLORIDE 240 MG/1
CAPSULE, COATED, EXTENDED RELEASE ORAL
Qty: 30 CAPSULE | Refills: 5 | Status: SHIPPED | OUTPATIENT
Start: 2019-08-05 | End: 2020-01-01

## 2019-08-05 RX ORDER — OXYBUTYNIN CHLORIDE 10 MG/1
10 TABLET, EXTENDED RELEASE ORAL DAILY
Qty: 30 TABLET | Refills: 3 | Status: SHIPPED | OUTPATIENT
Start: 2019-08-05 | End: 2019-01-01 | Stop reason: SDUPTHER

## 2019-08-07 RX ORDER — ALLOPURINOL 100 MG/1
TABLET ORAL
Qty: 90 TABLET | Refills: 3 | Status: SHIPPED | OUTPATIENT
Start: 2019-08-07 | End: 2020-01-01

## 2019-09-03 RX ORDER — INSULIN GLARGINE 100 [IU]/ML
INJECTION, SOLUTION SUBCUTANEOUS
Qty: 15 ML | Refills: 0 | Status: SHIPPED | OUTPATIENT
Start: 2019-09-03 | End: 2019-10-05 | Stop reason: SDUPTHER

## 2019-09-19 ENCOUNTER — OFFICE VISIT (OUTPATIENT)
Dept: PULMONOLOGY | Age: 84
End: 2019-09-19
Payer: MEDICARE

## 2019-09-19 VITALS
HEART RATE: 59 BPM | OXYGEN SATURATION: 97 % | BODY MASS INDEX: 36.44 KG/M2 | SYSTOLIC BLOOD PRESSURE: 119 MMHG | RESPIRATION RATE: 16 BRPM | DIASTOLIC BLOOD PRESSURE: 51 MMHG | WEIGHT: 198 LBS | HEIGHT: 62 IN

## 2019-09-19 DIAGNOSIS — I27.20 PULMONARY HTN (HCC): Primary | ICD-10-CM

## 2019-09-19 DIAGNOSIS — R09.02 HYPOXIA: ICD-10-CM

## 2019-09-19 DIAGNOSIS — G47.33 OSA ON CPAP: ICD-10-CM

## 2019-09-19 DIAGNOSIS — J41.0 SIMPLE CHRONIC BRONCHITIS (HCC): ICD-10-CM

## 2019-09-19 DIAGNOSIS — Z99.89 OSA ON CPAP: ICD-10-CM

## 2019-09-19 PROCEDURE — 99213 OFFICE O/P EST LOW 20 MIN: CPT | Performed by: INTERNAL MEDICINE

## 2019-09-19 PROCEDURE — G8427 DOCREV CUR MEDS BY ELIG CLIN: HCPCS | Performed by: INTERNAL MEDICINE

## 2019-09-19 PROCEDURE — 4040F PNEUMOC VAC/ADMIN/RCVD: CPT | Performed by: INTERNAL MEDICINE

## 2019-09-19 PROCEDURE — 1036F TOBACCO NON-USER: CPT | Performed by: INTERNAL MEDICINE

## 2019-09-19 PROCEDURE — 1090F PRES/ABSN URINE INCON ASSESS: CPT | Performed by: INTERNAL MEDICINE

## 2019-09-19 PROCEDURE — 1123F ACP DISCUSS/DSCN MKR DOCD: CPT | Performed by: INTERNAL MEDICINE

## 2019-09-19 PROCEDURE — G8417 CALC BMI ABV UP PARAM F/U: HCPCS | Performed by: INTERNAL MEDICINE

## 2019-09-19 PROCEDURE — 3023F SPIROM DOC REV: CPT | Performed by: INTERNAL MEDICINE

## 2019-09-19 PROCEDURE — G8926 SPIRO NO PERF OR DOC: HCPCS | Performed by: INTERNAL MEDICINE

## 2019-09-19 ASSESSMENT — SLEEP AND FATIGUE QUESTIONNAIRES: NECK CIRCUMFERENCE (INCHES): 15

## 2019-09-19 NOTE — PROGRESS NOTES
to take Xopenex nebulization on whenever necessary basis  · Patient was given Xopenex HFA inhaler and was told to take 2 puffs every 6 on whenever necessary basis and was taught how to take it properly-does not want to use it  · No need of any antibiotics or prednisone from pulmonary standpoint of view CP  · Patient to continue with her CPAP as before and it is working and she is compliant  · Patient to continue with oxygen supplementation to keep saturation between 92-77% only  · Certification for oxygen to be sent to her team a company  · Patient is up-to-date with Pneumonia vaccines   · Patient to get flu shot in October   · Patient's further treatment depending on patient's clinical status and the follow-up on above recommendations  · Patient to follow-up in 6 months time or earlier if required

## 2019-09-19 NOTE — PATIENT INSTRUCTIONS
Remember to bring all pulmonary medications to your next appointment with the office. Please keep all of your future appointments scheduled by [facility] office. Out of respect for other patients and providers, you may be asked to reschedule your appointment if you arrive later than your scheduled appointment time. Appointments cancelled less than 24hrs in advance will be considered a no show. Patients with three missed appointments within 1 year or four missed appointments within 2 years can be dismissed from the practice. You may receive a survey regarding the care you received during your visit. Your input is valuable to us. We encourage you to complete and return your survey. We hope you will choose us in the future for your healthcare needs.

## 2019-09-24 DIAGNOSIS — I10 ESSENTIAL HYPERTENSION: Primary | ICD-10-CM

## 2019-09-24 DIAGNOSIS — I10 ESSENTIAL HYPERTENSION: ICD-10-CM

## 2019-09-24 RX ORDER — VALSARTAN 40 MG/1
40 TABLET ORAL DAILY
Qty: 90 TABLET | Refills: 3 | Status: SHIPPED | OUTPATIENT
Start: 2019-09-24 | End: 2020-01-01

## 2019-09-24 RX ORDER — VALSARTAN 40 MG/1
40 TABLET ORAL DAILY
Qty: 30 TABLET | Refills: 3 | Status: SHIPPED | OUTPATIENT
Start: 2019-09-24 | End: 2019-09-24 | Stop reason: SDUPTHER

## 2019-09-24 NOTE — TELEPHONE ENCOUNTER
Refill request for valsartan medication.      Name of [facility] visit - 8/5/19     Pending visit - 2/5/2020    Last refill -9/24/19  3 refills    M

## 2019-10-07 RX ORDER — INSULIN GLARGINE 100 [IU]/ML
INJECTION, SOLUTION SUBCUTANEOUS
Qty: 15 ML | Refills: 0 | Status: SHIPPED | OUTPATIENT
Start: 2019-10-07 | End: 2019-01-01 | Stop reason: SDUPTHER

## 2019-10-21 RX ORDER — FUROSEMIDE 40 MG/1
20 TABLET ORAL DAILY
Qty: 60 TABLET | Refills: 3 | Status: SHIPPED | OUTPATIENT
Start: 2019-10-21 | End: 2019-01-01 | Stop reason: SDUPTHER

## 2020-01-01 ENCOUNTER — TELEPHONE (OUTPATIENT)
Dept: OTHER | Facility: CLINIC | Age: 85
End: 2020-01-01

## 2020-01-01 ENCOUNTER — TELEPHONE (OUTPATIENT)
Dept: CARDIOLOGY CLINIC | Age: 85
End: 2020-01-01

## 2020-01-01 ENCOUNTER — VIRTUAL VISIT (OUTPATIENT)
Dept: CARDIOLOGY CLINIC | Age: 85
End: 2020-01-01
Payer: MEDICARE

## 2020-01-01 ENCOUNTER — OFFICE VISIT (OUTPATIENT)
Dept: INTERNAL MEDICINE CLINIC | Age: 85
End: 2020-01-01
Payer: MEDICARE

## 2020-01-01 ENCOUNTER — CARE COORDINATION (OUTPATIENT)
Dept: CASE MANAGEMENT | Age: 85
End: 2020-01-01

## 2020-01-01 ENCOUNTER — TELEPHONE (OUTPATIENT)
Dept: PULMONOLOGY | Age: 85
End: 2020-01-01

## 2020-01-01 ENCOUNTER — TELEPHONE (OUTPATIENT)
Dept: INTERNAL MEDICINE CLINIC | Age: 85
End: 2020-01-01

## 2020-01-01 ENCOUNTER — HOSPITAL ENCOUNTER (INPATIENT)
Age: 85
LOS: 3 days | Discharge: HOME | DRG: 280 | End: 2020-10-25
Attending: EMERGENCY MEDICINE | Admitting: INTERNAL MEDICINE
Payer: MEDICARE

## 2020-01-01 ENCOUNTER — VIRTUAL VISIT (OUTPATIENT)
Dept: INTERNAL MEDICINE CLINIC | Age: 85
End: 2020-01-01
Payer: MEDICARE

## 2020-01-01 ENCOUNTER — VIRTUAL VISIT (OUTPATIENT)
Dept: PULMONOLOGY | Age: 85
End: 2020-01-01
Payer: MEDICARE

## 2020-01-01 ENCOUNTER — HOSPITAL ENCOUNTER (OUTPATIENT)
Age: 85
Discharge: HOME | End: 2020-02-05
Payer: MEDICARE

## 2020-01-01 ENCOUNTER — APPOINTMENT (OUTPATIENT)
Dept: GENERAL RADIOLOGY | Age: 85
DRG: 280 | End: 2020-01-01
Payer: MEDICARE

## 2020-01-01 ENCOUNTER — HOSPITAL ENCOUNTER (OUTPATIENT)
Age: 85
Discharge: HOME | End: 2020-01-16
Payer: MEDICARE

## 2020-01-01 VITALS
SYSTOLIC BLOOD PRESSURE: 130 MMHG | WEIGHT: 215 LBS | BODY MASS INDEX: 39.32 KG/M2 | OXYGEN SATURATION: 90 % | DIASTOLIC BLOOD PRESSURE: 76 MMHG | HEART RATE: 66 BPM

## 2020-01-01 VITALS
BODY MASS INDEX: 39.38 KG/M2 | HEIGHT: 62 IN | OXYGEN SATURATION: 92 % | TEMPERATURE: 97 F | WEIGHT: 214 LBS | SYSTOLIC BLOOD PRESSURE: 134 MMHG | HEART RATE: 88 BPM | RESPIRATION RATE: 20 BRPM | DIASTOLIC BLOOD PRESSURE: 63 MMHG

## 2020-01-01 LAB
A/G RATIO: 1.1 (ref 1.1–2.2)
A/G RATIO: 1.1 (ref 1.1–2.2)
ALBUMIN SERPL-MCNC: 3.7 G/DL (ref 3.4–5)
ALBUMIN SERPL-MCNC: 3.9 G/DL (ref 3.4–5)
ALP BLD-CCNC: 118 U/L (ref 40–129)
ALP BLD-CCNC: 125 U/L (ref 40–129)
ALT SERPL-CCNC: 14 U/L (ref 10–40)
ALT SERPL-CCNC: 18 U/L (ref 10–40)
ANA INTERPRETATION: ABNORMAL
ANA PATTERN: ABNORMAL
ANA TITER: ABNORMAL
ANION GAP SERPL CALCULATED.3IONS-SCNC: 11 MMOL/L (ref 3–16)
ANION GAP SERPL CALCULATED.3IONS-SCNC: 13 MMOL/L (ref 3–16)
ANION GAP SERPL CALCULATED.3IONS-SCNC: 13 MMOL/L (ref 3–16)
ANION GAP SERPL CALCULATED.3IONS-SCNC: 7 MMOL/L (ref 3–16)
ANION GAP SERPL CALCULATED.3IONS-SCNC: 9 MMOL/L (ref 3–16)
ANION GAP SERPL CALCULATED.3IONS-SCNC: 9 MMOL/L (ref 3–16)
ANTI-NUCLEAR ANTIBODY (ANA): POSITIVE
AST SERPL-CCNC: 15 U/L (ref 15–37)
AST SERPL-CCNC: 34 U/L (ref 15–37)
BASE EXCESS VENOUS: -5.2 MMOL/L (ref -3–3)
BASOPHILS ABSOLUTE: 0.1 K/UL (ref 0–0.2)
BASOPHILS RELATIVE PERCENT: 0.6 %
BASOPHILS RELATIVE PERCENT: 0.8 %
BASOPHILS RELATIVE PERCENT: 1 %
BILIRUB SERPL-MCNC: 0.4 MG/DL (ref 0–1)
BILIRUB SERPL-MCNC: <0.2 MG/DL (ref 0–1)
BLOOD CULTURE, ROUTINE: NORMAL
BUN BLDV-MCNC: 23 MG/DL (ref 7–20)
BUN BLDV-MCNC: 30 MG/DL (ref 7–20)
BUN BLDV-MCNC: 31 MG/DL (ref 7–20)
BUN BLDV-MCNC: 34 MG/DL (ref 7–20)
BUN BLDV-MCNC: 37 MG/DL (ref 7–20)
BUN BLDV-MCNC: 39 MG/DL (ref 7–20)
CALCIUM SERPL-MCNC: 10.1 MG/DL (ref 8.3–10.6)
CALCIUM SERPL-MCNC: 10.2 MG/DL (ref 8.3–10.6)
CALCIUM SERPL-MCNC: 8.9 MG/DL (ref 8.3–10.6)
CALCIUM SERPL-MCNC: 9.1 MG/DL (ref 8.3–10.6)
CALCIUM SERPL-MCNC: 9.2 MG/DL (ref 8.3–10.6)
CALCIUM SERPL-MCNC: 9.9 MG/DL (ref 8.3–10.6)
CARBOXYHEMOGLOBIN: 3 % (ref 0–1.5)
CHLORIDE BLD-SCNC: 100 MMOL/L (ref 99–110)
CHLORIDE BLD-SCNC: 102 MMOL/L (ref 99–110)
CHLORIDE BLD-SCNC: 102 MMOL/L (ref 99–110)
CHLORIDE BLD-SCNC: 104 MMOL/L (ref 99–110)
CHLORIDE BLD-SCNC: 95 MMOL/L (ref 99–110)
CHLORIDE BLD-SCNC: 98 MMOL/L (ref 99–110)
CHOLESTEROL, TOTAL: 134 MG/DL (ref 0–199)
CO2: 24 MMOL/L (ref 21–32)
CO2: 27 MMOL/L (ref 21–32)
CO2: 27 MMOL/L (ref 21–32)
CO2: 28 MMOL/L (ref 21–32)
CO2: 31 MMOL/L (ref 21–32)
CO2: 31 MMOL/L (ref 21–32)
CREAT SERPL-MCNC: 1 MG/DL (ref 0.6–1.2)
CREAT SERPL-MCNC: 1.1 MG/DL (ref 0.6–1.2)
CREAT SERPL-MCNC: 1.1 MG/DL (ref 0.6–1.2)
CREAT SERPL-MCNC: 1.2 MG/DL (ref 0.6–1.2)
CREAT SERPL-MCNC: 1.4 MG/DL (ref 0.6–1.2)
CREAT SERPL-MCNC: 1.4 MG/DL (ref 0.6–1.2)
CULTURE, BLOOD 2: NORMAL
EKG ATRIAL RATE: 116 BPM
EKG DIAGNOSIS: NORMAL
EKG Q-T INTERVAL: 330 MS
EKG QRS DURATION: 102 MS
EKG QTC CALCULATION (BAZETT): 458 MS
EKG R AXIS: 71 DEGREES
EKG T AXIS: 79 DEGREES
EKG VENTRICULAR RATE: 116 BPM
EOSINOPHILS ABSOLUTE: 0 K/UL (ref 0–0.6)
EOSINOPHILS ABSOLUTE: 0.1 K/UL (ref 0–0.6)
EOSINOPHILS ABSOLUTE: 0.3 K/UL (ref 0–0.6)
EOSINOPHILS RELATIVE PERCENT: 0.3 %
EOSINOPHILS RELATIVE PERCENT: 0.8 %
EOSINOPHILS RELATIVE PERCENT: 3 %
ESTIMATED AVERAGE GLUCOSE: 151.3 MG/DL
ESTIMATED AVERAGE GLUCOSE: 159.9 MG/DL
GFR AFRICAN AMERICAN: 43
GFR AFRICAN AMERICAN: 43
GFR AFRICAN AMERICAN: 51
GFR AFRICAN AMERICAN: 56
GFR AFRICAN AMERICAN: 56
GFR AFRICAN AMERICAN: >60
GFR NON-AFRICAN AMERICAN: 35
GFR NON-AFRICAN AMERICAN: 35
GFR NON-AFRICAN AMERICAN: 42
GFR NON-AFRICAN AMERICAN: 47
GFR NON-AFRICAN AMERICAN: 47
GFR NON-AFRICAN AMERICAN: 52
GLOBULIN: 3.3 G/DL
GLOBULIN: 3.6 G/DL
GLUCOSE BLD-MCNC: 101 MG/DL (ref 70–99)
GLUCOSE BLD-MCNC: 102 MG/DL (ref 70–99)
GLUCOSE BLD-MCNC: 109 MG/DL (ref 70–99)
GLUCOSE BLD-MCNC: 109 MG/DL (ref 70–99)
GLUCOSE BLD-MCNC: 111 MG/DL (ref 70–99)
GLUCOSE BLD-MCNC: 112 MG/DL (ref 70–99)
GLUCOSE BLD-MCNC: 112 MG/DL (ref 70–99)
GLUCOSE BLD-MCNC: 113 MG/DL (ref 70–99)
GLUCOSE BLD-MCNC: 113 MG/DL (ref 70–99)
GLUCOSE BLD-MCNC: 116 MG/DL (ref 70–99)
GLUCOSE BLD-MCNC: 122 MG/DL (ref 70–99)
GLUCOSE BLD-MCNC: 122 MG/DL (ref 70–99)
GLUCOSE BLD-MCNC: 123 MG/DL (ref 70–99)
GLUCOSE BLD-MCNC: 127 MG/DL (ref 70–99)
GLUCOSE BLD-MCNC: 127 MG/DL (ref 70–99)
GLUCOSE BLD-MCNC: 128 MG/DL (ref 70–99)
GLUCOSE BLD-MCNC: 176 MG/DL (ref 70–99)
GLUCOSE BLD-MCNC: 179 MG/DL (ref 70–99)
GLUCOSE BLD-MCNC: 181 MG/DL (ref 70–99)
GLUCOSE BLD-MCNC: 218 MG/DL (ref 70–99)
GLUCOSE BLD-MCNC: 76 MG/DL (ref 70–99)
GLUCOSE BLD-MCNC: 80 MG/DL (ref 70–99)
GLUCOSE BLD-MCNC: 88 MG/DL (ref 70–99)
HBA1C MFR BLD: 6.9 %
HBA1C MFR BLD: 7.2 %
HCO3 VENOUS: 21.9 MMOL/L (ref 23–29)
HCT VFR BLD CALC: 34.7 % (ref 36–48)
HCT VFR BLD CALC: 38.2 % (ref 36–48)
HCT VFR BLD CALC: 38.5 % (ref 36–48)
HDLC SERPL-MCNC: 42 MG/DL (ref 40–60)
HEMOGLOBIN: 11.1 G/DL (ref 12–16)
HEMOGLOBIN: 12.2 G/DL (ref 12–16)
HEMOGLOBIN: 12.3 G/DL (ref 12–16)
IGE: 418 KU/L
LACTIC ACID, SEPSIS: 1.4 MMOL/L (ref 0.4–1.9)
LDL CHOLESTEROL CALCULATED: 62 MG/DL
LV EF: 48 %
LVEF MODALITY: NORMAL
LYMPHOCYTES ABSOLUTE: 0.8 K/UL (ref 1–5.1)
LYMPHOCYTES ABSOLUTE: 1.3 K/UL (ref 1–5.1)
LYMPHOCYTES ABSOLUTE: 1.9 K/UL (ref 1–5.1)
LYMPHOCYTES RELATIVE PERCENT: 13.4 %
LYMPHOCYTES RELATIVE PERCENT: 15.8 %
LYMPHOCYTES RELATIVE PERCENT: 6.3 %
MCH RBC QN AUTO: 25.9 PG (ref 26–34)
MCH RBC QN AUTO: 25.9 PG (ref 26–34)
MCH RBC QN AUTO: 27.1 PG (ref 26–34)
MCHC RBC AUTO-ENTMCNC: 31.6 G/DL (ref 31–36)
MCHC RBC AUTO-ENTMCNC: 32.1 G/DL (ref 31–36)
MCHC RBC AUTO-ENTMCNC: 32.1 G/DL (ref 31–36)
MCV RBC AUTO: 80.7 FL (ref 80–100)
MCV RBC AUTO: 82.1 FL (ref 80–100)
MCV RBC AUTO: 84.5 FL (ref 80–100)
METHEMOGLOBIN VENOUS: 0.3 %
MONOCYTES ABSOLUTE: 0.3 K/UL (ref 0–1.3)
MONOCYTES ABSOLUTE: 0.5 K/UL (ref 0–1.3)
MONOCYTES ABSOLUTE: 0.6 K/UL (ref 0–1.3)
MONOCYTES RELATIVE PERCENT: 2.7 %
MONOCYTES RELATIVE PERCENT: 4.6 %
MONOCYTES RELATIVE PERCENT: 6.1 %
NEUTROPHILS ABSOLUTE: 11.3 K/UL (ref 1.7–7.7)
NEUTROPHILS ABSOLUTE: 7.4 K/UL (ref 1.7–7.7)
NEUTROPHILS ABSOLUTE: 9.3 K/UL (ref 1.7–7.7)
NEUTROPHILS RELATIVE PERCENT: 76.7 %
NEUTROPHILS RELATIVE PERCENT: 77.8 %
NEUTROPHILS RELATIVE PERCENT: 90.1 %
O2 CONTENT, VEN: 18 VOL %
O2 SAT, VEN: 99 %
O2 THERAPY: ABNORMAL
PATHOLOGIST: ABNORMAL
PCO2, VEN: 48.7 MMHG (ref 40–50)
PDW BLD-RTO: 14.8 % (ref 12.4–15.4)
PDW BLD-RTO: 16 % (ref 12.4–15.4)
PDW BLD-RTO: 17.1 % (ref 12.4–15.4)
PERFORMED ON: ABNORMAL
PERFORMED ON: NORMAL
PERFORMED ON: NORMAL
PH VENOUS: 7.27 (ref 7.35–7.45)
PLATELET # BLD: 228 K/UL (ref 135–450)
PLATELET # BLD: 251 K/UL (ref 135–450)
PLATELET # BLD: 253 K/UL (ref 135–450)
PMV BLD AUTO: 8.2 FL (ref 5–10.5)
PMV BLD AUTO: 8.2 FL (ref 5–10.5)
PMV BLD AUTO: 8.7 FL (ref 5–10.5)
PO2, VEN: 152.9 MMHG (ref 25–40)
POTASSIUM REFLEX MAGNESIUM: 4 MMOL/L (ref 3.5–5.1)
POTASSIUM REFLEX MAGNESIUM: 4.2 MMOL/L (ref 3.5–5.1)
POTASSIUM REFLEX MAGNESIUM: 4.3 MMOL/L (ref 3.5–5.1)
POTASSIUM REFLEX MAGNESIUM: 4.6 MMOL/L (ref 3.5–5.1)
POTASSIUM SERPL-SCNC: 4.6 MMOL/L (ref 3.5–5.1)
POTASSIUM SERPL-SCNC: 4.6 MMOL/L (ref 3.5–5.1)
PRO-BNP: 8863 PG/ML (ref 0–449)
PROCALCITONIN: 0.08 NG/ML (ref 0–0.15)
RAPID INFLUENZA  B AGN: NEGATIVE
RAPID INFLUENZA A AGN: NEGATIVE
RBC # BLD: 4.3 M/UL (ref 4–5.2)
RBC # BLD: 4.52 M/UL (ref 4–5.2)
RBC # BLD: 4.69 M/UL (ref 4–5.2)
SARS-COV-2, NAAT: NOT DETECTED
SODIUM BLD-SCNC: 135 MMOL/L (ref 136–145)
SODIUM BLD-SCNC: 137 MMOL/L (ref 136–145)
SODIUM BLD-SCNC: 137 MMOL/L (ref 136–145)
SODIUM BLD-SCNC: 140 MMOL/L (ref 136–145)
SODIUM BLD-SCNC: 140 MMOL/L (ref 136–145)
SODIUM BLD-SCNC: 142 MMOL/L (ref 136–145)
TCO2 CALC VENOUS: 23 MMOL/L
TOTAL PROTEIN: 7 G/DL (ref 6.4–8.2)
TOTAL PROTEIN: 7.5 G/DL (ref 6.4–8.2)
TRIGL SERPL-MCNC: 148 MG/DL (ref 0–150)
TROPONIN: 0.64 NG/ML
TROPONIN: 0.66 NG/ML
TROPONIN: 0.8 NG/ML
TROPONIN: 1.16 NG/ML
TSH REFLEX: 3.28 UIU/ML (ref 0.27–4.2)
TSH SERPL DL<=0.05 MIU/L-ACNC: 5.85 UIU/ML (ref 0.27–4.2)
VLDLC SERPL CALC-MCNC: 30 MG/DL
WBC # BLD: 11.9 K/UL (ref 4–11)
WBC # BLD: 12.5 K/UL (ref 4–11)
WBC # BLD: 9.7 K/UL (ref 4–11)

## 2020-01-01 PROCEDURE — 93306 TTE W/DOPPLER COMPLETE: CPT

## 2020-01-01 PROCEDURE — G0009 ADMIN PNEUMOCOCCAL VACCINE: HCPCS | Performed by: INTERNAL MEDICINE

## 2020-01-01 PROCEDURE — 4040F PNEUMOC VAC/ADMIN/RCVD: CPT | Performed by: INTERNAL MEDICINE

## 2020-01-01 PROCEDURE — 1111F DSCHRG MED/CURRENT MED MERGE: CPT | Performed by: INTERNAL MEDICINE

## 2020-01-01 PROCEDURE — 3288F FALL RISK ASSESSMENT DOCD: CPT | Performed by: INTERNAL MEDICINE

## 2020-01-01 PROCEDURE — 1123F ACP DISCUSS/DSCN MKR DOCD: CPT | Performed by: INTERNAL MEDICINE

## 2020-01-01 PROCEDURE — 6370000000 HC RX 637 (ALT 250 FOR IP): Performed by: INTERNAL MEDICINE

## 2020-01-01 PROCEDURE — 36415 COLL VENOUS BLD VENIPUNCTURE: CPT

## 2020-01-01 PROCEDURE — 94660 CPAP INITIATION&MGMT: CPT

## 2020-01-01 PROCEDURE — 99214 OFFICE O/P EST MOD 30 MIN: CPT | Performed by: INTERNAL MEDICINE

## 2020-01-01 PROCEDURE — 80061 LIPID PANEL: CPT

## 2020-01-01 PROCEDURE — 83036 HEMOGLOBIN GLYCOSYLATED A1C: CPT

## 2020-01-01 PROCEDURE — 99291 CRITICAL CARE FIRST HOUR: CPT | Performed by: INTERNAL MEDICINE

## 2020-01-01 PROCEDURE — 83605 ASSAY OF LACTIC ACID: CPT

## 2020-01-01 PROCEDURE — G8510 SCR DEP NEG, NO PLAN REQD: HCPCS | Performed by: INTERNAL MEDICINE

## 2020-01-01 PROCEDURE — 6370000000 HC RX 637 (ALT 250 FOR IP): Performed by: HOSPITALIST

## 2020-01-01 PROCEDURE — 1090F PRES/ABSN URINE INCON ASSESS: CPT | Performed by: INTERNAL MEDICINE

## 2020-01-01 PROCEDURE — 93005 ELECTROCARDIOGRAM TRACING: CPT | Performed by: EMERGENCY MEDICINE

## 2020-01-01 PROCEDURE — 84484 ASSAY OF TROPONIN QUANT: CPT

## 2020-01-01 PROCEDURE — 2060000000 HC ICU INTERMEDIATE R&B

## 2020-01-01 PROCEDURE — 94669 MECHANICAL CHEST WALL OSCILL: CPT

## 2020-01-01 PROCEDURE — 99239 HOSP IP/OBS DSCHRG MGMT >30: CPT | Performed by: INTERNAL MEDICINE

## 2020-01-01 PROCEDURE — 6360000002 HC RX W HCPCS: Performed by: EMERGENCY MEDICINE

## 2020-01-01 PROCEDURE — 2580000003 HC RX 258: Performed by: HOSPITALIST

## 2020-01-01 PROCEDURE — 99233 SBSQ HOSP IP/OBS HIGH 50: CPT | Performed by: INTERNAL MEDICINE

## 2020-01-01 PROCEDURE — G8427 DOCREV CUR MEDS BY ELIG CLIN: HCPCS | Performed by: INTERNAL MEDICINE

## 2020-01-01 PROCEDURE — 94640 AIRWAY INHALATION TREATMENT: CPT

## 2020-01-01 PROCEDURE — 96374 THER/PROPH/DIAG INJ IV PUSH: CPT

## 2020-01-01 PROCEDURE — 80053 COMPREHEN METABOLIC PANEL: CPT

## 2020-01-01 PROCEDURE — 84145 PROCALCITONIN (PCT): CPT

## 2020-01-01 PROCEDURE — 80048 BASIC METABOLIC PNL TOTAL CA: CPT

## 2020-01-01 PROCEDURE — 2700000000 HC OXYGEN THERAPY PER DAY

## 2020-01-01 PROCEDURE — 1036F TOBACCO NON-USER: CPT | Performed by: INTERNAL MEDICINE

## 2020-01-01 PROCEDURE — 99213 OFFICE O/P EST LOW 20 MIN: CPT | Performed by: INTERNAL MEDICINE

## 2020-01-01 PROCEDURE — U0002 COVID-19 LAB TEST NON-CDC: HCPCS

## 2020-01-01 PROCEDURE — 2580000003 HC RX 258: Performed by: INTERNAL MEDICINE

## 2020-01-01 PROCEDURE — 99232 SBSQ HOSP IP/OBS MODERATE 35: CPT | Performed by: INTERNAL MEDICINE

## 2020-01-01 PROCEDURE — 85025 COMPLETE CBC W/AUTO DIFF WBC: CPT

## 2020-01-01 PROCEDURE — 82803 BLOOD GASES ANY COMBINATION: CPT

## 2020-01-01 PROCEDURE — G8482 FLU IMMUNIZE ORDER/ADMIN: HCPCS | Performed by: INTERNAL MEDICINE

## 2020-01-01 PROCEDURE — 6360000002 HC RX W HCPCS: Performed by: INTERNAL MEDICINE

## 2020-01-01 PROCEDURE — 83880 ASSAY OF NATRIURETIC PEPTIDE: CPT

## 2020-01-01 PROCEDURE — 94761 N-INVAS EAR/PLS OXIMETRY MLT: CPT

## 2020-01-01 PROCEDURE — 90732 PPSV23 VACC 2 YRS+ SUBQ/IM: CPT | Performed by: INTERNAL MEDICINE

## 2020-01-01 PROCEDURE — 84443 ASSAY THYROID STIM HORMONE: CPT

## 2020-01-01 PROCEDURE — 99223 1ST HOSP IP/OBS HIGH 75: CPT | Performed by: INTERNAL MEDICINE

## 2020-01-01 PROCEDURE — 99285 EMERGENCY DEPT VISIT HI MDM: CPT

## 2020-01-01 PROCEDURE — 99443 PR PHYS/QHP TELEPHONE EVALUATION 21-30 MIN: CPT | Performed by: INTERNAL MEDICINE

## 2020-01-01 PROCEDURE — 87804 INFLUENZA ASSAY W/OPTIC: CPT

## 2020-01-01 PROCEDURE — 87040 BLOOD CULTURE FOR BACTERIA: CPT

## 2020-01-01 PROCEDURE — 71045 X-RAY EXAM CHEST 1 VIEW: CPT

## 2020-01-01 PROCEDURE — 93010 ELECTROCARDIOGRAM REPORT: CPT | Performed by: INTERNAL MEDICINE

## 2020-01-01 PROCEDURE — G8417 CALC BMI ABV UP PARAM F/U: HCPCS | Performed by: INTERNAL MEDICINE

## 2020-01-01 PROCEDURE — 86039 ANTINUCLEAR ANTIBODIES (ANA): CPT

## 2020-01-01 PROCEDURE — G8926 SPIRO NO PERF OR DOC: HCPCS | Performed by: INTERNAL MEDICINE

## 2020-01-01 PROCEDURE — 6360000002 HC RX W HCPCS: Performed by: HOSPITALIST

## 2020-01-01 PROCEDURE — 2580000003 HC RX 258: Performed by: EMERGENCY MEDICINE

## 2020-01-01 PROCEDURE — 86038 ANTINUCLEAR ANTIBODIES: CPT

## 2020-01-01 PROCEDURE — G8484 FLU IMMUNIZE NO ADMIN: HCPCS | Performed by: INTERNAL MEDICINE

## 2020-01-01 PROCEDURE — 3023F SPIROM DOC REV: CPT | Performed by: INTERNAL MEDICINE

## 2020-01-01 PROCEDURE — 82785 ASSAY OF IGE: CPT

## 2020-01-01 RX ORDER — FUROSEMIDE 40 MG/1
60 TABLET ORAL DAILY
COMMUNITY

## 2020-01-01 RX ORDER — DILTIAZEM HYDROCHLORIDE 240 MG/1
240 CAPSULE, COATED, EXTENDED RELEASE ORAL DAILY
Status: DISCONTINUED | OUTPATIENT
Start: 2020-01-01 | End: 2020-01-01

## 2020-01-01 RX ORDER — INSULIN GLARGINE 100 [IU]/ML
24 INJECTION, SOLUTION SUBCUTANEOUS 2 TIMES DAILY
Status: DISCONTINUED | OUTPATIENT
Start: 2020-01-01 | End: 2020-01-01

## 2020-01-01 RX ORDER — DILTIAZEM HYDROCHLORIDE 240 MG/1
CAPSULE, COATED, EXTENDED RELEASE ORAL
Qty: 30 CAPSULE | Refills: 5 | Status: ON HOLD | OUTPATIENT
Start: 2020-01-01 | End: 2020-01-01 | Stop reason: HOSPADM

## 2020-01-01 RX ORDER — ALLOPURINOL 100 MG/1
TABLET ORAL
Qty: 90 TABLET | Refills: 3 | Status: SHIPPED | OUTPATIENT
Start: 2020-01-01 | End: 2020-01-01 | Stop reason: SDUPTHER

## 2020-01-01 RX ORDER — FUROSEMIDE 10 MG/ML
40 INJECTION INTRAMUSCULAR; INTRAVENOUS DAILY
Status: DISCONTINUED | OUTPATIENT
Start: 2020-01-01 | End: 2020-01-01 | Stop reason: HOSPADM

## 2020-01-01 RX ORDER — CLORAZEPATE DIPOTASSIUM 7.5 MG/1
TABLET ORAL
Qty: 90 TABLET | Refills: 0 | Status: SHIPPED | OUTPATIENT
Start: 2020-01-01 | End: 2021-01-03

## 2020-01-01 RX ORDER — DEXTROSE MONOHYDRATE 50 MG/ML
100 INJECTION, SOLUTION INTRAVENOUS PRN
Status: DISCONTINUED | OUTPATIENT
Start: 2020-01-01 | End: 2020-01-01 | Stop reason: HOSPADM

## 2020-01-01 RX ORDER — ATORVASTATIN CALCIUM 20 MG/1
20 TABLET, FILM COATED ORAL NIGHTLY
Qty: 30 TABLET | Refills: 3 | Status: SHIPPED | OUTPATIENT
Start: 2020-01-01

## 2020-01-01 RX ORDER — ACETAMINOPHEN 650 MG/1
650 SUPPOSITORY RECTAL EVERY 6 HOURS PRN
Status: DISCONTINUED | OUTPATIENT
Start: 2020-01-01 | End: 2020-01-01 | Stop reason: HOSPADM

## 2020-01-01 RX ORDER — IPRATROPIUM BROMIDE AND ALBUTEROL SULFATE 2.5; .5 MG/3ML; MG/3ML
1 SOLUTION RESPIRATORY (INHALATION) 3 TIMES DAILY
Status: DISCONTINUED | OUTPATIENT
Start: 2020-01-01 | End: 2020-01-01 | Stop reason: HOSPADM

## 2020-01-01 RX ORDER — BLOOD SUGAR DIAGNOSTIC
STRIP MISCELLANEOUS
Qty: 300 STRIP | Refills: 0 | Status: SHIPPED | OUTPATIENT
Start: 2020-01-01 | End: 2020-01-01 | Stop reason: SDUPTHER

## 2020-01-01 RX ORDER — VALSARTAN 40 MG/1
40 TABLET ORAL DAILY
Qty: 90 TABLET | Refills: 3 | Status: ON HOLD | OUTPATIENT
Start: 2020-01-01 | End: 2020-01-01 | Stop reason: HOSPADM

## 2020-01-01 RX ORDER — SODIUM CHLORIDE 0.9 % (FLUSH) 0.9 %
10 SYRINGE (ML) INJECTION EVERY 12 HOURS SCHEDULED
Status: DISCONTINUED | OUTPATIENT
Start: 2020-01-01 | End: 2020-01-01 | Stop reason: HOSPADM

## 2020-01-01 RX ORDER — ACETAMINOPHEN 325 MG/1
650 TABLET ORAL EVERY 6 HOURS PRN
Status: DISCONTINUED | OUTPATIENT
Start: 2020-01-01 | End: 2020-01-01 | Stop reason: HOSPADM

## 2020-01-01 RX ORDER — LEVALBUTEROL INHALATION SOLUTION 1.25 MG/3ML
SOLUTION RESPIRATORY (INHALATION)
Qty: 3 ML | Refills: 5 | Status: SHIPPED | OUTPATIENT
Start: 2020-01-01

## 2020-01-01 RX ORDER — DILTIAZEM HYDROCHLORIDE 240 MG/1
240 CAPSULE, EXTENDED RELEASE ORAL DAILY
COMMUNITY
End: 2020-01-01 | Stop reason: ALTCHOICE

## 2020-01-01 RX ORDER — OXYBUTYNIN CHLORIDE 10 MG/1
10 TABLET, EXTENDED RELEASE ORAL DAILY
Qty: 30 TABLET | Refills: 5 | Status: SHIPPED | OUTPATIENT
Start: 2020-01-01

## 2020-01-01 RX ORDER — ATORVASTATIN CALCIUM 10 MG/1
20 TABLET, FILM COATED ORAL NIGHTLY
Status: DISCONTINUED | OUTPATIENT
Start: 2020-01-01 | End: 2020-01-01 | Stop reason: HOSPADM

## 2020-01-01 RX ORDER — BENZONATATE 100 MG/1
100 CAPSULE ORAL 3 TIMES DAILY PRN
Qty: 30 CAPSULE | Refills: 0 | Status: SHIPPED | OUTPATIENT
Start: 2020-01-01 | End: 2020-01-01

## 2020-01-01 RX ORDER — PROMETHAZINE HYDROCHLORIDE 25 MG/1
12.5 TABLET ORAL EVERY 6 HOURS PRN
Status: DISCONTINUED | OUTPATIENT
Start: 2020-01-01 | End: 2020-01-01 | Stop reason: HOSPADM

## 2020-01-01 RX ORDER — VALSARTAN 40 MG/1
40 TABLET ORAL DAILY
COMMUNITY
End: 2020-01-01 | Stop reason: ALTCHOICE

## 2020-01-01 RX ORDER — ATENOLOL 25 MG/1
25 TABLET ORAL 2 TIMES DAILY
Qty: 90 TABLET | Refills: 3 | Status: SHIPPED | OUTPATIENT
Start: 2020-01-01

## 2020-01-01 RX ORDER — OXYBUTYNIN CHLORIDE 10 MG/1
10 TABLET, EXTENDED RELEASE ORAL DAILY
Qty: 90 TABLET | OUTPATIENT
Start: 2020-01-01

## 2020-01-01 RX ORDER — ONDANSETRON 2 MG/ML
4 INJECTION INTRAMUSCULAR; INTRAVENOUS EVERY 6 HOURS PRN
Status: DISCONTINUED | OUTPATIENT
Start: 2020-01-01 | End: 2020-01-01 | Stop reason: HOSPADM

## 2020-01-01 RX ORDER — VALSARTAN 80 MG/1
40 TABLET ORAL DAILY
Status: DISCONTINUED | OUTPATIENT
Start: 2020-01-01 | End: 2020-01-01

## 2020-01-01 RX ORDER — IPRATROPIUM BROMIDE AND ALBUTEROL SULFATE 2.5; .5 MG/3ML; MG/3ML
1 SOLUTION RESPIRATORY (INHALATION) EVERY 4 HOURS PRN
Status: DISCONTINUED | OUTPATIENT
Start: 2020-01-01 | End: 2020-01-01 | Stop reason: HOSPADM

## 2020-01-01 RX ORDER — INSULIN GLARGINE 100 [IU]/ML
20 INJECTION, SOLUTION SUBCUTANEOUS 2 TIMES DAILY
Status: DISCONTINUED | OUTPATIENT
Start: 2020-01-01 | End: 2020-01-01 | Stop reason: HOSPADM

## 2020-01-01 RX ORDER — OXYBUTYNIN CHLORIDE 10 MG/1
10 TABLET, EXTENDED RELEASE ORAL DAILY
Qty: 30 TABLET | Refills: 1 | Status: SHIPPED | OUTPATIENT
Start: 2020-01-01 | End: 2020-01-01

## 2020-01-01 RX ORDER — DILTIAZEM HYDROCHLORIDE 5 MG/ML
10 INJECTION INTRAVENOUS ONCE
Status: DISCONTINUED | OUTPATIENT
Start: 2020-01-01 | End: 2020-01-01

## 2020-01-01 RX ORDER — BENZONATATE 100 MG/1
100 CAPSULE ORAL 3 TIMES DAILY PRN
Status: DISCONTINUED | OUTPATIENT
Start: 2020-01-01 | End: 2020-01-01 | Stop reason: HOSPADM

## 2020-01-01 RX ORDER — ASPIRIN 81 MG/1
81 TABLET, CHEWABLE ORAL DAILY
Status: DISCONTINUED | OUTPATIENT
Start: 2020-01-01 | End: 2020-01-01 | Stop reason: HOSPADM

## 2020-01-01 RX ORDER — OXYBUTYNIN CHLORIDE 5 MG/1
10 TABLET, EXTENDED RELEASE ORAL DAILY
Status: DISCONTINUED | OUTPATIENT
Start: 2020-01-01 | End: 2020-01-01 | Stop reason: HOSPADM

## 2020-01-01 RX ORDER — NICOTINE POLACRILEX 4 MG
15 LOZENGE BUCCAL PRN
Status: DISCONTINUED | OUTPATIENT
Start: 2020-01-01 | End: 2020-01-01 | Stop reason: HOSPADM

## 2020-01-01 RX ORDER — SODIUM CHLORIDE 0.9 % (FLUSH) 0.9 %
10 SYRINGE (ML) INJECTION PRN
Status: DISCONTINUED | OUTPATIENT
Start: 2020-01-01 | End: 2020-01-01 | Stop reason: HOSPADM

## 2020-01-01 RX ORDER — FUROSEMIDE 40 MG/1
60 TABLET ORAL DAILY
Qty: 135 TABLET | Refills: 1 | Status: ON HOLD | OUTPATIENT
Start: 2020-01-01 | End: 2020-01-01 | Stop reason: HOSPADM

## 2020-01-01 RX ORDER — DILTIAZEM HYDROCHLORIDE 240 MG/1
CAPSULE, COATED, EXTENDED RELEASE ORAL
Qty: 30 CAPSULE | Refills: 5 | Status: SHIPPED | OUTPATIENT
Start: 2020-01-01 | End: 2020-01-01

## 2020-01-01 RX ORDER — IPRATROPIUM BROMIDE AND ALBUTEROL SULFATE 2.5; .5 MG/3ML; MG/3ML
1 SOLUTION RESPIRATORY (INHALATION)
Status: DISCONTINUED | OUTPATIENT
Start: 2020-01-01 | End: 2020-01-01

## 2020-01-01 RX ORDER — INSULIN GLARGINE 100 [IU]/ML
INJECTION, SOLUTION SUBCUTANEOUS
Qty: 15 ML | Refills: 0 | Status: SHIPPED | OUTPATIENT
Start: 2020-01-01 | End: 2020-01-01

## 2020-01-01 RX ORDER — DEXTROSE MONOHYDRATE 25 G/50ML
12.5 INJECTION, SOLUTION INTRAVENOUS PRN
Status: DISCONTINUED | OUTPATIENT
Start: 2020-01-01 | End: 2020-01-01 | Stop reason: HOSPADM

## 2020-01-01 RX ORDER — SODIUM CHLORIDE 9 MG/ML
1000 INJECTION, SOLUTION INTRAVENOUS CONTINUOUS
Status: DISCONTINUED | OUTPATIENT
Start: 2020-01-01 | End: 2020-01-01

## 2020-01-01 RX ORDER — ASPIRIN 81 MG/1
81 TABLET, CHEWABLE ORAL DAILY
Qty: 30 TABLET | Refills: 3 | Status: SHIPPED | OUTPATIENT
Start: 2020-01-01

## 2020-01-01 RX ORDER — BLOOD SUGAR DIAGNOSTIC
1 STRIP MISCELLANEOUS DAILY
Qty: 100 STRIP | Refills: 3 | Status: SHIPPED | OUTPATIENT
Start: 2020-01-01

## 2020-01-01 RX ORDER — POLYETHYLENE GLYCOL 3350 17 G/17G
17 POWDER, FOR SOLUTION ORAL DAILY PRN
Status: DISCONTINUED | OUTPATIENT
Start: 2020-01-01 | End: 2020-01-01 | Stop reason: HOSPADM

## 2020-01-01 RX ORDER — ALLOPURINOL 100 MG/1
100 TABLET ORAL DAILY
Status: DISCONTINUED | OUTPATIENT
Start: 2020-01-01 | End: 2020-01-01 | Stop reason: HOSPADM

## 2020-01-01 RX ORDER — 0.9 % SODIUM CHLORIDE 0.9 %
30 INTRAVENOUS SOLUTION INTRAVENOUS ONCE
Status: DISCONTINUED | OUTPATIENT
Start: 2020-01-01 | End: 2020-01-01

## 2020-01-01 RX ORDER — ALLOPURINOL 100 MG/1
TABLET ORAL
Qty: 90 TABLET | Refills: 3 | Status: SHIPPED | OUTPATIENT
Start: 2020-01-01

## 2020-01-01 RX ORDER — LEVALBUTEROL INHALATION SOLUTION 1.25 MG/3ML
SOLUTION RESPIRATORY (INHALATION)
Qty: 3 ML | Refills: 5 | Status: SHIPPED | OUTPATIENT
Start: 2020-01-01 | End: 2020-01-01 | Stop reason: SDUPTHER

## 2020-01-01 RX ORDER — FUROSEMIDE 10 MG/ML
40 INJECTION INTRAMUSCULAR; INTRAVENOUS 2 TIMES DAILY
Status: DISCONTINUED | OUTPATIENT
Start: 2020-01-01 | End: 2020-01-01

## 2020-01-01 RX ORDER — FUROSEMIDE 10 MG/ML
40 INJECTION INTRAMUSCULAR; INTRAVENOUS ONCE
Status: COMPLETED | OUTPATIENT
Start: 2020-01-01 | End: 2020-01-01

## 2020-01-01 RX ADMIN — IPRATROPIUM BROMIDE AND ALBUTEROL SULFATE 1 AMPULE: .5; 3 SOLUTION RESPIRATORY (INHALATION) at 19:35

## 2020-01-01 RX ADMIN — METOPROLOL TARTRATE 12.5 MG: 25 TABLET, FILM COATED ORAL at 20:27

## 2020-01-01 RX ADMIN — FUROSEMIDE 40 MG: 10 INJECTION, SOLUTION INTRAMUSCULAR; INTRAVENOUS at 08:29

## 2020-01-01 RX ADMIN — SODIUM CHLORIDE 1000 ML: 9 INJECTION, SOLUTION INTRAVENOUS at 00:24

## 2020-01-01 RX ADMIN — FUROSEMIDE 40 MG: 10 INJECTION, SOLUTION INTRAMUSCULAR; INTRAVENOUS at 08:40

## 2020-01-01 RX ADMIN — RIVAROXABAN 15 MG: 15 TABLET, FILM COATED ORAL at 18:31

## 2020-01-01 RX ADMIN — ASPIRIN 81 MG CHEWABLE TABLET 81 MG: 81 TABLET CHEWABLE at 08:21

## 2020-01-01 RX ADMIN — FUROSEMIDE 40 MG: 10 INJECTION, SOLUTION INTRAMUSCULAR; INTRAVENOUS at 09:49

## 2020-01-01 RX ADMIN — ATORVASTATIN CALCIUM 20 MG: 10 TABLET, FILM COATED ORAL at 21:20

## 2020-01-01 RX ADMIN — IPRATROPIUM BROMIDE AND ALBUTEROL SULFATE 1 AMPULE: .5; 3 SOLUTION RESPIRATORY (INHALATION) at 20:55

## 2020-01-01 RX ADMIN — RIVAROXABAN 15 MG: 15 TABLET, FILM COATED ORAL at 17:51

## 2020-01-01 RX ADMIN — IPRATROPIUM BROMIDE AND ALBUTEROL SULFATE 1 AMPULE: .5; 3 SOLUTION RESPIRATORY (INHALATION) at 17:04

## 2020-01-01 RX ADMIN — MUPIROCIN: 20 OINTMENT TOPICAL at 09:48

## 2020-01-01 RX ADMIN — FUROSEMIDE 40 MG: 10 INJECTION, SOLUTION INTRAMUSCULAR; INTRAVENOUS at 09:42

## 2020-01-01 RX ADMIN — MUPIROCIN: 20 OINTMENT TOPICAL at 21:29

## 2020-01-01 RX ADMIN — MUPIROCIN: 20 OINTMENT TOPICAL at 21:22

## 2020-01-01 RX ADMIN — OXYBUTYNIN CHLORIDE 10 MG: 5 TABLET, EXTENDED RELEASE ORAL at 08:21

## 2020-01-01 RX ADMIN — BENZONATATE 100 MG: 100 CAPSULE ORAL at 06:57

## 2020-01-01 RX ADMIN — Medication 10 ML: at 20:28

## 2020-01-01 RX ADMIN — IPRATROPIUM BROMIDE AND ALBUTEROL SULFATE 1 AMPULE: .5; 3 SOLUTION RESPIRATORY (INHALATION) at 11:53

## 2020-01-01 RX ADMIN — ALLOPURINOL 100 MG: 100 TABLET ORAL at 08:39

## 2020-01-01 RX ADMIN — INSULIN GLARGINE 20 UNITS: 100 INJECTION, SOLUTION SUBCUTANEOUS at 09:48

## 2020-01-01 RX ADMIN — Medication 10 ML: at 08:23

## 2020-01-01 RX ADMIN — ALLOPURINOL 100 MG: 100 TABLET ORAL at 08:21

## 2020-01-01 RX ADMIN — ALLOPURINOL 100 MG: 100 TABLET ORAL at 08:29

## 2020-01-01 RX ADMIN — MUPIROCIN: 20 OINTMENT TOPICAL at 08:29

## 2020-01-01 RX ADMIN — Medication 10 ML: at 08:40

## 2020-01-01 RX ADMIN — Medication 10 ML: at 21:20

## 2020-01-01 RX ADMIN — MUPIROCIN: 20 OINTMENT TOPICAL at 08:46

## 2020-01-01 RX ADMIN — INSULIN GLARGINE 20 UNITS: 100 INJECTION, SOLUTION SUBCUTANEOUS at 21:18

## 2020-01-01 RX ADMIN — Medication 10 ML: at 09:49

## 2020-01-01 RX ADMIN — IPRATROPIUM BROMIDE AND ALBUTEROL SULFATE 1 AMPULE: .5; 3 SOLUTION RESPIRATORY (INHALATION) at 21:50

## 2020-01-01 RX ADMIN — OXYBUTYNIN CHLORIDE 10 MG: 5 TABLET, EXTENDED RELEASE ORAL at 08:29

## 2020-01-01 RX ADMIN — METOPROLOL TARTRATE 12.5 MG: 25 TABLET, FILM COATED ORAL at 21:11

## 2020-01-01 RX ADMIN — IPRATROPIUM BROMIDE AND ALBUTEROL SULFATE 1 AMPULE: .5; 3 SOLUTION RESPIRATORY (INHALATION) at 23:55

## 2020-01-01 RX ADMIN — INSULIN GLARGINE 20 UNITS: 100 INJECTION, SOLUTION SUBCUTANEOUS at 21:30

## 2020-01-01 RX ADMIN — IPRATROPIUM BROMIDE AND ALBUTEROL SULFATE 1 AMPULE: .5; 3 SOLUTION RESPIRATORY (INHALATION) at 07:09

## 2020-01-01 RX ADMIN — IPRATROPIUM BROMIDE AND ALBUTEROL SULFATE 1 AMPULE: .5; 3 SOLUTION RESPIRATORY (INHALATION) at 14:54

## 2020-01-01 RX ADMIN — MUPIROCIN: 20 OINTMENT TOPICAL at 08:22

## 2020-01-01 RX ADMIN — ALLOPURINOL 100 MG: 100 TABLET ORAL at 12:56

## 2020-01-01 RX ADMIN — Medication 10 ML: at 08:30

## 2020-01-01 RX ADMIN — Medication 10 ML: at 21:17

## 2020-01-01 RX ADMIN — OXYBUTYNIN CHLORIDE 10 MG: 5 TABLET, EXTENDED RELEASE ORAL at 12:55

## 2020-01-01 RX ADMIN — RIVAROXABAN 15 MG: 15 TABLET, FILM COATED ORAL at 17:33

## 2020-01-01 RX ADMIN — OXYBUTYNIN CHLORIDE 10 MG: 5 TABLET, EXTENDED RELEASE ORAL at 08:39

## 2020-01-01 RX ADMIN — IPRATROPIUM BROMIDE AND ALBUTEROL SULFATE 1 AMPULE: .5; 3 SOLUTION RESPIRATORY (INHALATION) at 15:08

## 2020-01-01 RX ADMIN — ASPIRIN 81 MG CHEWABLE TABLET 81 MG: 81 TABLET CHEWABLE at 08:29

## 2020-01-01 RX ADMIN — ASPIRIN 81 MG CHEWABLE TABLET 81 MG: 81 TABLET CHEWABLE at 08:39

## 2020-01-01 RX ADMIN — IPRATROPIUM BROMIDE AND ALBUTEROL SULFATE 1 AMPULE: .5; 3 SOLUTION RESPIRATORY (INHALATION) at 07:54

## 2020-01-01 RX ADMIN — BENZONATATE 100 MG: 100 CAPSULE ORAL at 21:06

## 2020-01-01 RX ADMIN — IPRATROPIUM BROMIDE AND ALBUTEROL SULFATE 1 AMPULE: .5; 3 SOLUTION RESPIRATORY (INHALATION) at 11:41

## 2020-01-01 RX ADMIN — IPRATROPIUM BROMIDE AND ALBUTEROL SULFATE 1 AMPULE: .5; 3 SOLUTION RESPIRATORY (INHALATION) at 23:44

## 2020-01-01 RX ADMIN — IPRATROPIUM BROMIDE AND ALBUTEROL SULFATE 1 AMPULE: .5; 3 SOLUTION RESPIRATORY (INHALATION) at 11:19

## 2020-01-01 RX ADMIN — INSULIN GLARGINE 20 UNITS: 100 INJECTION, SOLUTION SUBCUTANEOUS at 20:31

## 2020-01-01 RX ADMIN — METOPROLOL TARTRATE 12.5 MG: 25 TABLET, FILM COATED ORAL at 08:39

## 2020-01-01 RX ADMIN — FUROSEMIDE 40 MG: 10 INJECTION, SOLUTION INTRAMUSCULAR; INTRAVENOUS at 01:40

## 2020-01-01 RX ADMIN — METOPROLOL TARTRATE 12.5 MG: 25 TABLET, FILM COATED ORAL at 12:55

## 2020-01-01 RX ADMIN — METOPROLOL TARTRATE 12.5 MG: 25 TABLET, FILM COATED ORAL at 21:20

## 2020-01-01 RX ADMIN — MUPIROCIN: 20 OINTMENT TOPICAL at 20:27

## 2020-01-01 RX ADMIN — ASPIRIN 81 MG CHEWABLE TABLET 81 MG: 81 TABLET CHEWABLE at 12:55

## 2020-01-01 RX ADMIN — FUROSEMIDE 40 MG: 10 INJECTION, SOLUTION INTRAMUSCULAR; INTRAVENOUS at 17:33

## 2020-01-01 RX ADMIN — METOPROLOL TARTRATE 12.5 MG: 25 TABLET, FILM COATED ORAL at 08:29

## 2020-01-01 RX ADMIN — METOPROLOL TARTRATE 12.5 MG: 25 TABLET, FILM COATED ORAL at 08:27

## 2020-01-01 RX ADMIN — IPRATROPIUM BROMIDE AND ALBUTEROL SULFATE 1 AMPULE: .5; 3 SOLUTION RESPIRATORY (INHALATION) at 07:22

## 2020-01-01 RX ADMIN — ATORVASTATIN CALCIUM 20 MG: 10 TABLET, FILM COATED ORAL at 20:27

## 2020-01-01 ASSESSMENT — SLEEP AND FATIGUE QUESTIONNAIRES
HOW LIKELY ARE YOU TO NOD OFF OR FALL ASLEEP WHILE SITTING INACTIVE IN A PUBLIC PLACE: 0
HOW LIKELY ARE YOU TO NOD OFF OR FALL ASLEEP WHILE SITTING AND READING: 0
ESS TOTAL SCORE: 3
HOW LIKELY ARE YOU TO NOD OFF OR FALL ASLEEP IN A CAR, WHILE STOPPED FOR A FEW MINUTES IN TRAFFIC: 0
HOW LIKELY ARE YOU TO NOD OFF OR FALL ASLEEP WHILE SITTING AND TALKING TO SOMEONE: 0
HOW LIKELY ARE YOU TO NOD OFF OR FALL ASLEEP WHEN YOU ARE A PASSENGER IN A CAR FOR AN HOUR WITHOUT A BREAK: 0
HOW LIKELY ARE YOU TO NOD OFF OR FALL ASLEEP WHILE WATCHING TV: 0
HOW LIKELY ARE YOU TO NOD OFF OR FALL ASLEEP WHILE SITTING QUIETLY AFTER LUNCH WITHOUT ALCOHOL: 0
HOW LIKELY ARE YOU TO NOD OFF OR FALL ASLEEP WHILE LYING DOWN TO REST IN THE AFTERNOON WHEN CIRCUMSTANCES PERMIT: 3

## 2020-01-01 ASSESSMENT — PATIENT HEALTH QUESTIONNAIRE - PHQ9
1. LITTLE INTEREST OR PLEASURE IN DOING THINGS: 1
SUM OF ALL RESPONSES TO PHQ QUESTIONS 1-9: 2
SUM OF ALL RESPONSES TO PHQ QUESTIONS 1-9: 2
SUM OF ALL RESPONSES TO PHQ9 QUESTIONS 1 & 2: 2
2. FEELING DOWN, DEPRESSED OR HOPELESS: 1

## 2020-01-01 ASSESSMENT — PAIN SCALES - GENERAL
PAINLEVEL_OUTOF10: 0
PAINLEVEL_OUTOF10: 10
PAINLEVEL_OUTOF10: 0

## 2020-01-01 ASSESSMENT — ENCOUNTER SYMPTOMS
ABDOMINAL DISTENTION: 0
NAUSEA: 0
DIARRHEA: 0
VOMITING: 0
COUGH: 0
CONSTIPATION: 0
WHEEZING: 0
CHEST TIGHTNESS: 0
BACK PAIN: 1
SHORTNESS OF BREATH: 0

## 2020-01-01 ASSESSMENT — PAIN DESCRIPTION - LOCATION: LOCATION: BACK

## 2020-02-05 NOTE — PROGRESS NOTES
She exhibits no tenderness. Abdominal: Soft. She exhibits no distension and no mass. There is no tenderness. There is no rebound and no guarding. Musculoskeletal: She exhibits no edema. Neurological: She is alert and oriented to person, place, and time. Skin: She is not diaphoretic. Psychiatric: She has a normal mood and affect. Her behavior is normal. Judgment and thought content normal.   Vitals reviewed. Assessment:      Dimas Edwards was seen today for follow-up from hospital.    Diagnoses and all orders for this visit:    Persistent atrial fibrillation ([address]  In sinus rhythm. Continue to monitor    ILD (interstitial lung disease) ([address]  Stable. Continue to monitor    Essential hypertension  Controlled. Continue to monitor      KATIANA on CPAP    Renal insufficiency  Repeat lab today    Type 2 diabetes mellitus with hyperglycemia, with long-term current use of insulin (HCC)  Improved.  Continue to monitor     Abn TSH  Recheck labs today     Pneumovax today   Plan:      See above plan

## 2020-02-06 NOTE — TELEPHONE ENCOUNTER
Refill request for oxybutinin medication.      [facility]    Last visit - 2-5-20     Pending visit - 8-5-20    Last refill -12-8-19    Medication Contract signed -   Kemal randle-     Additional Comments

## 2020-03-04 NOTE — TELEPHONE ENCOUNTER
Refill request for lantus medication.      [facility]    Last visit - 2-5-20     Pending visit - 8-5-20    Last refill -2-7-20    Medication Contract signed -   Kemal randle-     Additional Comments

## 2020-04-02 NOTE — TELEPHONE ENCOUNTER
Refill request for lantus medication.      [facility]    Last visit - 2-5-20     Pending visit - 8-5-20    Last refill -3-4-20    Medication Contract signed -   Kemal randle-     Additional Comments

## 2020-04-30 PROBLEM — Z87.01 HISTORY OF PNEUMONIA: Status: ACTIVE | Noted: 2020-01-01

## 2020-04-30 PROBLEM — N28.9 RENAL IMPAIRMENT: Status: ACTIVE | Noted: 2020-01-01

## 2020-04-30 PROBLEM — E11.9 TYPE 2 DIABETES MELLITUS WITHOUT COMPLICATION (HCC): Status: ACTIVE | Noted: 2020-01-01

## 2020-04-30 PROBLEM — H43.819 POSTERIOR VITREOUS DETACHMENT: Status: ACTIVE | Noted: 2019-07-31

## 2020-04-30 PROBLEM — H35.3134 ADVANCED ATROPHIC NONEXUDATIVE AGE-RELATED MACULAR DEGENERATION OF BOTH EYES WITH SUBFOVEAL INVOLVEMENT: Status: ACTIVE | Noted: 2020-01-01

## 2020-04-30 PROBLEM — R94.6 ABNORMAL FINDING ON THYROID FUNCTION TEST: Status: ACTIVE | Noted: 2020-01-01

## 2020-04-30 PROBLEM — H43.813 POSTERIOR VITREOUS DETACHMENT OF BOTH EYES: Status: ACTIVE | Noted: 2020-01-01

## 2020-04-30 PROBLEM — I51.9 HEART DISEASE: Status: ACTIVE | Noted: 2020-01-01

## 2020-07-06 NOTE — TELEPHONE ENCOUNTER
Patients daughter is calling in regards to patients furosemide rx. States that Dr. Lucian Block had increased her furosemide to 60mg so she has been taking 1.5 tabs and only has a couple of days left of her medication. Med pended with new instructions and quantity and daughter also requested 90 days supply with refills.

## 2020-08-10 NOTE — PROGRESS NOTES
8/10/2020    TELEHEALTH EVALUATION -- Audio/Visual (During PBZCY-60 public health emergency)    HPI:    Mariah Stevens (:  1930) has requested an audio/video evaluation for the following concern(s):      Pt here for f/u DM, COPD, ILD, KATIANA, chronic back pain  Patient states that her breathing is back to baseline. Uses oxygen 4 L, xopenex nebulizer    States her blood sugars have been stable on Lantus. HgA1c 6.9. On losartan 25mg. utd on foot and microalbumin    Uses her cpap nightly. Wakes up feeling refreshed     chronic low back pain is stable. Denies radiation/loss of control over bowels/bladder/numbness    Takes tranxene-T 7.5mg intermittently when she develops a rash. Rash usually occurs when pt is anxious    htn improved on losartan 25mg and lasix 40mg. [place] Denies cp/pnd/ortopnea. Stable renal function      Review of Systems   Constitutional: Negative for unexpected weight change. Respiratory: Negative for cough, chest tightness, shortness of breath and wheezing. Cardiovascular: Negative for chest pain, palpitations and leg swelling. Gastrointestinal: Negative for abdominal distention, constipation, diarrhea, nausea and vomiting. Musculoskeletal: Positive for back pain. Negative for arthralgias and myalgias. Neurological: Negative for tremors and numbness. All other systems reviewed and are negative. Prior to Visit Medications    Medication Sig Taking? Authorizing Provider   dilTIAZem (CARDIZEM CD) 240 MG extended release capsule TAKE 1 CAPSULE BY MOUTH DAILY Yes Miguelito Patrick MD   rivaroxaban (XARELTO) 15 MG TABS tablet TAKE 1 TABLET BY MOUTH EVERY MORNING WITH BREAKFAST Yes Miguelito Patrick MD   allopurinol (ZYLOPRIM) 100 MG tablet TAKE 1 TABLET BY MOUTH EVERY DAY Yes Miguelito Patrick MD   furosemide (LASIX) 40 MG tablet Take 1.5 tablets by mouth daily Yes Miguelito Patrick MD   blood glucose test strips (ONETOUCH ULTRA) strip 1 each by Does not apply route daily As needed.  Yes Samantha Lackey MD   clorazepate (TRANXENE) 7.5 MG tablet Take 1 tablet by mouth nightly for 90 days. Yes PAM Crain - CNP   LANTUS SOLOSTAR 100 UNIT/ML injection pen ADMINISTER 24 UNITS UNDER THE SKIN TWICE DAILY Yes Samantha Lackey MD   oxybutynin (DITROPAN-XL) 10 MG extended release tablet TAKE 1 TABLET BY MOUTH DAILY Yes Samantha Lackey MD   Lancet Devices (ONE TOUCH DELICA LANCING DEV) MISC 1 each by Does not apply route daily Yes Samantha Lackey MD   valsartan (DIOVAN) 40 MG tablet TAKE 1 TABLET BY MOUTH DAILY Yes Samantha Lackey MD   Blood Glucose Monitoring Suppl (ONE TOUCH ULTRA MINI) w/Device KIT 1 kit by Does not apply route daily Yes Samantha Lackey MD   Handicap Placard MISC by Does not apply route Unable to walk distance, expiration 5 years Yes Samantha Lackey MD   CALCIUM PO Take by mouth Yes Historical Provider, MD   CPAP Machine MISC by Does not apply route nightly Yes Historical Provider, MD   levalbuterol (XOPENEX HFA) 45 MCG/ACT inhaler INHALE 2 PUFFS INTO THE LUNGS EVERY 4 HOURS AS NEEDED FOR WHEEZING  Jamee Sol MD   levalbuterol (Carlos Aloe) 1.25 MG/3ML nebulizer solution USE 1 NEBULE QID PRN  Historical Provider, MD       Social History     Tobacco Use    Smoking status: Never Smoker    Smokeless tobacco: Never Used   Substance Use Topics    Alcohol use: No    Drug use: No        PHYSICAL EXAMINATION:  [ INSTRUCTIONS:  \"[x]\" Indicates a positive item  \"[]\" Indicates a negative item  -- DELETE ALL ITEMS NOT EXAMINED]  Vital Signs: (As obtained by patient/caregiver or practitioner observation)    Constitutional: [x] Appears well-developed and well-nourished [x] No apparent distress      [] Abnormal-   Mental status  [x] Alert and awake  [x] Oriented to person/place/time [x]Able to follow commands      Eyes:  EOM    [x]  Normal  [] Abnormal-  Sclera  [x]  Normal  [] Abnormal -         Discharge [x]  None visible  [] Abnormal -    HENT:   [x] Normocephalic, atraumatic.   [] Preparedness and Response Supplemental Appropriations Act, this Virtual Visit was conducted with patient's (and/or legal guardian's) consent, to reduce the patient's risk of exposure to COVID-19 and provide necessary medical care. The patient (and/or legal guardian) has also been advised to contact this office for worsening conditions or problems, and seek emergency medical treatment and/or call 911 if deemed necessary. Patient identification was verified at the start of the visit: Yes    Total time spent on this encounter: 15 minutes    Services were provided through a video synchronous discussion virtually to substitute for in-person clinic visit. Patient and provider were located at their individual homes. --Lida Medrano MD on 8/10/2020 at 12:53 PM    An electronic signature was used to authenticate this note.

## 2020-08-24 NOTE — TELEPHONE ENCOUNTER
Refill request for levalbuterol medication.      [facility]    Last visit - 8/10/20     Pending visit -     Last refill -8/10/20    Medication Contract signed -   Last Oarrs ran-     Additional Comments

## 2020-09-03 NOTE — TELEPHONE ENCOUNTER
Refill request for valsartan medication.      Name of [facility] visit - 8/10/2020     Pending visit - 2/10/2021    Last refill -9/24/19  3 refills

## 2020-09-16 NOTE — TELEPHONE ENCOUNTER
Spoke with Viridiana Pozo pt daughter per laurent she said Ginny Escobedo has moved in with her and has had multiple falls recently. She would like a order for a stair lift. She is going to call with the company and fax number where she will want this order sent to. Please write written rx for stair lift.

## 2020-09-16 NOTE — TELEPHONE ENCOUNTER
----- Message from Selma Avina sent at 9/16/2020 11:12 AM EDT -----  Subject: Message to Provider    QUESTIONS  Information for Provider? Patients Daughter is requesting a call in   regards to the patient   ---------------------------------------------------------------------------  --------------  CALL BACK INFO  What is the best way for the office to contact you? OK to leave message on   voicemail  Preferred Call Back Phone Number? [phone]  ---------------------------------------------------------------------------  --------------  SCRIPT ANSWERS  Relationship to Patient?  Self

## 2020-09-17 NOTE — TELEPHONE ENCOUNTER
----- Message from Tyrell De La Cruz sent at 9/17/2020  2:21 PM EDT -----  Subject: Message to Provider    QUESTIONS  Information for Provider? Patient's daughter is calling to get an order   for a human floor lift and a letter of medical necessity to help her get   insurance to cover this so they don't have to pay completely out of   pocket.   ---------------------------------------------------------------------------  --------------  CALL BACK INFO  What is the best way for the office to contact you? OK to leave message on   voicemail  Preferred Call Back Phone Number? [phone]  ---------------------------------------------------------------------------  --------------  SCRIPT ANSWERS  Relationship to Patient? Other  Representative Name? Pallavi Mccurdy   Is the Representative on the appropriate HIPAA document in Epic?  Yes

## 2020-09-17 NOTE — LETTER
[facility] [address] [facility]  Phone: [phone]  Fax: [phone]    Pita Ruano MD        September 21, 2020      To whom it may concern,    Due to Maycathy Florescairn 12-4-30 debility she would benefit from a floor lift. It medical opinion that this is medically necessary. If you have any questions or concerns, please don't hesitate to call.     Sincerely,        Pita Ruano MD

## 2020-09-29 NOTE — TELEPHONE ENCOUNTER
Allopurinol ordered. Have pharmacy send refill request for second medication.  I can't find anything in epic

## 2020-09-29 NOTE — TELEPHONE ENCOUNTER
----- Message from Brandiraul Kamaraon sent at 9/29/2020 11:26 AM EDT -----  Subject: Message to Provider    QUESTIONS  Information for Provider? Patients daughter wants to refill a medicine for   her mom's gout. She is taking the allopurinol (ZYLOPRIM but the gout has   flared up more and she used to take an addition med when this happens. ---------------------------------------------------------------------------  --------------  Kamla Vance INFO  What is the best way for the office to contact you? OK to leave message on   voicemail  Preferred Call Back Phone Number? [phone]  ---------------------------------------------------------------------------  --------------  SCRIPT ANSWERS  Relationship to Patient?  Self

## 2020-10-05 NOTE — TELEPHONE ENCOUNTER
Refill request for ditropan medication.      [facility]      Last visit - 8-10-20     Pending visit - 2-10-21    Last refill -9-3-20      Medication Contract signed -   Kemal randle-         Additional Comments

## 2020-10-05 NOTE — TELEPHONE ENCOUNTER
Refill request for clorazepate  medication.      Name of [facility]- [facility]       Last visit - 8-     Pending visit - 2-    Last refill -6-      Medication Contract signed -1-2-2019   Last [place] ran- 6-        Additional Comments

## 2020-10-22 PROBLEM — I50.9 HEART FAILURE EXACERBATED BY SOTALOL (HCC): Status: RESOLVED | Noted: 2020-01-01 | Resolved: 2020-01-01

## 2020-10-22 PROBLEM — N18.32 CHRONIC KIDNEY DISEASE (CKD) STAGE G3B/A1, MODERATELY DECREASED GLOMERULAR FILTRATION RATE (GFR) BETWEEN 30-44 ML/MIN/1.73 SQUARE METER AND ALBUMINURIA CREATININE RATIO LESS THAN 30 MG/G (HCC): Status: ACTIVE | Noted: 2020-01-01

## 2020-10-22 PROBLEM — I50.9 HEART FAILURE EXACERBATED BY SOTALOL (HCC): Status: ACTIVE | Noted: 2020-01-01

## 2020-10-22 PROBLEM — R77.8 ELEVATED TROPONIN: Status: ACTIVE | Noted: 2020-01-01

## 2020-10-22 NOTE — PROGRESS NOTES
10/22/20 0650   NIV Type   Skin Assessment Clean, dry, & intact   NIV Started/Stopped On   Equipment Type V60   Mode Bilevel   Mask Type Full face mask   Mask Size Medium   Settings/Measurements   IPAP 16 cmH20   CPAP/EPAP 8 cmH2O   Rate Ordered 14   Resp 28   FiO2  45 %   Vt Exhaled 617 mL   Mask Leak (lpm) 2 lpm   Comfort Level Good   Using Accessory Muscles No   SpO2 93   Breath Sounds   Right Upper Lobe Diminished   Right Middle Lobe Diminished   Right Lower Lobe Diminished   Left Upper Lobe Diminished   Left Lower Lobe Diminished   Alarm Settings   Alarms On Y   Press Low Alarm 8 cmH2O   High Pressure Alarm 40 cmH2O   Delay Alarm 20 sec(s)   Resp Rate Low Alarm 12   High Respiratory Rate 50 br/min

## 2020-10-22 NOTE — PROGRESS NOTES
RESPIRATORY THERAPY ASSESSMENT    Name:  Ninfa Dunne  Medical Record Number:  [id number]  Age: 80 y.o. Gender: female  : 1930  Today's Date:  10/22/2020  Room:  3008/3008-01    Assessment     Is the patient being admitted for a COPD or Asthma exacerbation? No   (If yes the patient will be seen every 4 hours for the first 24 hours and then reassessed)    Patient Admission Diagnosis      Allergies  Allergies   Allergen Reactions    Penicillins        Minimum Predicted Vital Capacity:     bipap          Actual Vital Capacity:      bipap              Pulmonary History:COPD, CHF/Pulmonary Edema and ILD  Home Oxygen Therapy:  4 liters/min via nasal cannula  Home Respiratory Therapy:Levalbuterol QID prn  Current Respiratory Therapy:  duoneb Q4w/a          Respiratory Severity Index(RSI)   Patients with orders for inhalation medications, oxygen, or any therapeutic treatment modality will be placed on Respiratory Protocol. They will be assessed with the first treatment and at least every 72 hours thereafter. The following severity scale will be used to determine frequency of treatment intervention.     Smoking History: Mild Exacerbation = 3    Social History  Social History     Tobacco Use    Smoking status: Never Smoker    Smokeless tobacco: Never Used   Substance Use Topics    Alcohol use: No    Drug use: No       Recent Surgical History: None = 0  Past Surgical History  Past Surgical History:   Procedure Laterality Date    HYSTERECTOMY         Level of Consciousness: Alert, Oriented, and Cooperative = 0    Level of Activity: Bedridden, unresponsive or quadriplegic = 4    Respiratory Pattern: Regular Pattern; RR 8-20 = 0    Breath Sounds: Diminshed bilaterally and/or crackles = 2    Sputum   ,  ,    Cough: Strong, spontaneous, non-productive = 0    Vital Signs   /60   Pulse 104   Temp 98.2 °F (36.8 °C)   Resp (!) 31   Ht 5' 2\" (1.575 m)   Wt 210 lb (95.3 kg)   SpO2 91%   BMI 38.41 kg/m² SPO2 (COPD values may differ): 88-89% on room air or greater than 92% on FiO2 28- 35% = 2    Peak Flow (asthma only): not applicable = 0    RSI: 24-99 = Q6H or QID and Q4HPRN for dyspnea        Plan       Goals: medication    Patient/caregiver was educated on the proper method of use for Respiratory Care Devices:  Yes      Level of patient/caregiver understanding able to:   ? Verbalize understanding   ? Demonstrate understanding       ? Teach back        ? Needs reinforcement       ? No available caregiver               ? Other:     Response to education:  Good     Is patient being placed on Home Treatment Regimen? Yes     Does the patient have everything they need prior to discharge? Yes     Comments: chart reviewed, pt assessed    Plan of Care: duoneb Q4w/a and Q4prn    Electronically signed by Mei Solo RCP on 10/22/2020 at 1:30 PM    Respiratory Protocol Guidelines     1. Assessment and treatment by Respiratory Therapy will be initiated for medication and therapeutic interventions upon initiation of aerosolized medication. 2. Physician will be contacted for respiratory rate (RR) greater than 35 breaths per minute. Therapy will be held for heart rate (HR) greater than 140 beats per minute, pending direction from physician. 3. Bronchodilators will be administered via Metered Dose Inhaler (MDI) with spacer when the following criteria are met:  a. Alert and cooperative     b. HR < 140 bpm  c. RR < 30 bpm                d. Can demonstrate a 2-3 second inspiratory hold  4. Bronchodilators will be administered via Hand Held Nebulizer [facility]) to patients when ANY of the following criteria are met  a. Incognizant or uncooperative          b. Patients treated with HHN at Home        c. Unable to demonstrate proper use of MDI with spacer     d. RR > 30 bpm   5. Bronchodilators will be delivered via Metered Dose Inhaler (MDI), HHN, Aerogen to intubated patients on mechanical ventilation.   6. Inhalation medication orders will be delivered and/or substituted as outlined below. Aerosolized Medications Ordering and Administration Guidelines:    1. All Medications will be ordered by a physician, and their frequency and/or modality will be adjusted as defined by the patients Respiratory Severity Index (RSI) score. 2. If the patient does not have documented COPD, consider discontinuing anticholinergics when RSI is less than 9.  3. If the bronchospasm worsens (increased RSI), then the bronchodilator frequency can be increased to a maximum of every 4 hours. If greater than every 4 hours is required, the physician will be contacted. 4. If the bronchospasm improves, the frequency of the bronchodilator can be decreased, based on the patient's RSI, but not less than home treatment regimen frequency. 5. Bronchodilator(s) will be discontinued if patient has a RSI less than 9 and has received no scheduled or as needed treatment for 72  Hrs. Patients Ordered on a Mucolytic Agent:    1. Must always be administered with a bronchodilator. 2. Discontinue if patient experiences worsened bronchospasm, or secretions have lessened to the point that the patient is able to clear them with a cough. Anti-inflammatory and Combination Medications:    1. If the patient lacks prior history of lung disease, is not using inhaled anti-inflammatory medication at home, and lacks wheezing by examination or by history for at least 24 hours, contact physician for possible discontinuation.

## 2020-10-22 NOTE — CONSULTS
[facility]  [phone]        Reason for Consultation/Chief Complaint: \"I have been having SOB. \"    Consulted for NSTEMI    History of Present Illness:  Karine Nelson is a 80 y.o. patient who presented to [facility] 10/22/20 with c/o swelling and SOB. She has seen Dr. Leena Rodríguez in past 2017 and Dr Cheo Mckeon CNP,Dr Pili Ramirez 2018 for Watchman evaluation. She has PMH of PAF on eliquis 15mg daily, MAT, HTN, CHF, CKD, COPD on 4L NC oxygen, DM, and gout  Most recent ECHO 5/24/2018 showed EF=45-50%  (EF=55% in 4/17); Mild anterolateral HK; moderate MAC; Mild MR, AR; AV sclerosis; mild pulm HTN. Now she presents with c/o increasing SOB and LE swelling x 4 days. She denies any chest pain. Reports SOB both stress and rest. Hypoxic into 60's and need O2 supplementation. Admit EKG revealed Accelerated Junctional rhythm 116bpm; Nonspecific ST abnormality; IVCD (7/18 EKG afib). Note Troponin 0.64, 0.66, 0.80, BNP 8863; CXR CHF with CM, vascular congestion and small bilateral pleural effusions. On admission O2 sats 68% admitted to ICU placed on Bipap. Covid and flu negative; BUN/Cr=30/1.2; K+ 4.6; H/H=12.2/38. 5. Patient with no complaints of chest pain, palpitations, dizziness, or orthopnea/PND. I have been asked to provide consultation regarding further management and testing. Past Medical History:   has a past medical history of COPD (chronic obstructive pulmonary disease) ([facility] [address] Diabetes mellitus ([address] Gout, Hyperlipidemia, Hypertension, and Paroxysmal A-fib ([facility] [address] Surgical History:   has a past surgical history that includes Hysterectomy. Social History:   reports that she has never smoked. She has never used smokeless tobacco. She reports that she does not drink alcohol or use drugs. Family History:  Reviewed and non-contributory    Home Medications:  Were reviewed and are listed in nursing record.  and/or listed below  Prior to Admission medications    Medication Sig Start Date End Date Taking?  Authorizing Provider   furosemide (LASIX) 40 MG tablet Take 60 mg by mouth daily 1.5 tablets daily   Yes Historical Provider, MD   oxybutynin (DITROPAN-XL) 10 MG extended release tablet TAKE 1 TABLET BY MOUTH DAILY 10/5/20  Yes PAM Pacheco CNP   clorazepate (TRANXENE) 7.5 MG tablet TAKE 1 TABLET BY MOUTH EVERY NIGHT 10/5/20 1/3/21 Yes PAM Pacheco CNP   allopurinol (ZYLOPRIM) 100 MG tablet TAKE 1 TABLET BY MOUTH EVERY DAY 9/29/20  Yes Jose Alejandro Crawley MD   valsartan (DIOVAN) 40 MG tablet TAKE 1 TABLET BY MOUTH DAILY 9/3/20  Yes Lexine Peabody, MD   levalbuterol (XOPENEX) 1.25 MG/3ML nebulizer solution USE 1 NEBULE QID PRN 8/24/20  Yes Jose Alejandro Crawley MD   dilTIAZem (CARDIZEM CD) 240 MG extended release capsule TAKE 1 CAPSULE BY MOUTH DAILY 8/3/20  Yes Jose Alejandro Crawley MD   rivaroxaban (XARELTO) 15 MG TABS tablet TAKE 1 TABLET BY MOUTH EVERY MORNING WITH BREAKFAST 8/3/20  Yes Jose Alejandro Crawley MD   LANTUS SOLOSTAR 100 UNIT/ML injection pen ADMINISTER 24 UNITS UNDER THE SKIN TWICE DAILY 6/2/20  Yes Jose Alejandro Crawley MD   Lancet Devices (ONE TOUCH Adia Dimmer LANCING DEV) [address] [facility] 1 each by Does not apply route daily 12/16/19  Yes Jose Alejandro Crawley MD   CALCIUM PO Take by mouth   Yes Historical Provider, MD   CPAP Machine MISC by Does not apply route nightly   Yes Historical Provider, MD   blood glucose test strips (ONETOUCH ULTRA) strip 1 each by Does not apply route daily DMll, E11.9 10/2/20   PAM Ba CNP   furosemide (LASIX) 40 MG tablet Take 1.5 tablets by mouth daily 7/6/20 10/4/20  Jose Alejandro Crawley MD   Blood Glucose Monitoring Suppl (ONE TOUCH ULTRA MINI) w/Device KIT 1 kit by Does not apply route daily 9/18/18   Jose Alejandro Crawley MD   levalbuterol [facility]) 45 MCG/ACT inhaler INHALE 2 PUFFS INTO THE LUNGS EVERY 4 HOURS AS NEEDED FOR WHEEZING 8/17/18 4/30/20  Mandy Quintanilla MD   Handkecia Domingo MISC by Does not apply route Unable to walk distance, expiration 5 years 6/19/18   Nicole Purcell MD        Allergies:  Penicillins     Review of Systems:   12 point ROS negative in all areas as listed below except as in [place]  Constitutional, EENT, Cardiovascular, pulmonary, GI, , Musculoskeletal, skin, neurological, hematological, endocrine, Psychiatric    Physical Examination:    Vitals:    10/22/20 0800   BP: 139/62   Pulse: 86   Resp: 20   Temp:    SpO2: 97%    Weight: 210 lb (95.3 kg)         General Appearance:  Alert on Bipap; cooperative, appears stated age   Head:  Normocephalic, without obvious abnormality, atraumatic   Eyes:  PERRL, conjunctiva/corneas clear       Nose: Nares normal, no drainage or sinus tenderness   Throat: Lips, mucosa, and tongue normal   Neck: Supple, symmetrical, trachea midline, no adenopathy, thyroid: not enlarged, symmetric, no tenderness/mass/nodules, no carotid bruit or JVD       Lungs:   +crackles bases and rhoncous bs with scattered wheezing   Chest Wall:  No tenderness or deformity   Heart:  Regular rate and rhythm, S1, S2 normal, no murmur, rub or gallop   Abdomen:   Soft, non-tender, bowel sounds active all four quadrants,  no masses, no organomegaly           Extremities: Extremities normal, atraumatic, no cyanosis; 1+ BLE edema R>L   Pulses: 2+ and symmetric   Skin: Skin color, texture, turgor normal, no rashes or lesions   Pysch: Normal mood and affect   Neurologic: Normal gross motor and sensory exam.         Labs  CBC:   Lab Results   Component Value Date    WBC 12.5 10/21/2020    RBC 4.69 10/21/2020    HGB 12.2 10/21/2020    HCT 38.5 10/21/2020    MCV 82.1 10/21/2020    RDW 17.1 10/21/2020     10/21/2020     CMP:    Lab Results   Component Value Date     10/21/2020    K 4.6 10/21/2020    CL 98 10/21/2020    CO2 28 10/21/2020    BUN 30 10/21/2020    CREATININE 1.2 10/21/2020    GFRAA 51 10/21/2020    GFRAA >60 05/22/2010    AGRATIO 1.1 10/21/2020    LABGLOM 42 10/21/2020    GLUCOSE 218 10/21/2020    PROT 7.5 10/21/2020 PROT 7.0 05/22/2010    CALCIUM 10.2 10/21/2020    BILITOT 0.4 10/21/2020    ALKPHOS 125 10/21/2020    AST 34 10/21/2020    ALT 18 10/21/2020     PT/INR:  No results found for: PTINR  Lab Results   Component Value Date    CKTOTAL 60 11/10/2015    TROPONINI 0.80 (Merged with [facility]) 10/22/2020       EKG: 10/21/20 I have reviewed EKG with the following interpretation:  Impression:  See HPI Accelerated Junctional rhythmNonspecific ST abnormalityNon-specific intra-ventricular conduction delayWhen compared with ECG of 11-JUL-2018 14:29,Junctional rhythm has replaced Atrial fibrillationConfirmed by ROBERT NICHOLS, [place] Bashir ([id number]) on 10/22/2020 7:38:19 AM     CXR 10/21/20  CHF findings with cardiomegaly, vascular congestion and small bilateral pleural effusions. Superimposed pneumonia cannot be excluded   FINDINGS: Cardiomegaly with vascular congestion, small bilateral pleural effusions and bibasilar airspace disease. No pneumothorax or free air. Echo 4/11/2017:  Normal left ventricular systolic function with an estimated ejection fraction of 55%.   There is mild concentric left ventricular hypertrophy.   Elevated left ventricular diastolic filling pressure.   The right ventricle is moderately enlarged.   The right atrium is moderately dilated.   Mild mitral annular calcification. Corinda Repress tricuspid regurgitation.   Systolic pulmonary artery pressure (SPAP) is estimated at 64 mmHg consistent with severe pulmonary hypertension (RA pressure 3 mmHg). Echo 5/24/2018:  Atrial fibrillations with rapid ventricular response. Definity contrast is used. Mild anterolateral wall hypokinesia. EF estimated 45-50%.   Normal left ventricular size with moderate concentric left ventricular  hypertrophy.   Left ventricular diastolic filling pressure is elevated. Moderate mitral annular calcification. No mitral stenosis. Mild mitral regurgitation. Left atrium is of normal size.   Aortic valve appears sclerotic but opens adequately. Mild aortic valve will hold for now. I spent 35 minutes on consult reviewing chart, labs/meds, history/physical, and d/w patient/daughter. Patient Active Problem List   Diagnosis    Multifocal atrial tachycardia (HCC)    Hyperkalemia    Essential hypertension    Acute on chronic diastolic CHF (congestive heart failure) (HCC)    Persistent atrial fibrillation (HCC)    Dyspnea and respiratory abnormalities    Acute respiratory failure with hypoxemia (HCC)    ILD (interstitial lung disease) ([facility] [address]    Pulmonary HTN (HCC)    [place] on CPAP    Hyperglycemia    Hypoxia    Acute pulmonary edema (HCC)    Altered mental status    DM (diabetes mellitus) ([facility] [address]    Chronic obstructive pulmonary disease (HCC)    Anxiety    Abnormal finding on thyroid function test    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 Further evaluation will be based upon the patient's clinical course and testing results.

## 2020-10-22 NOTE — FLOWSHEET NOTE
10/22/20 1300 10/22/20 1301   Vitals   Pulse 106 104   Resp 22 (!) 31   BP  --  125/60   MAP (mmHg)  --  79   Oxygen Therapy   SpO2 90 % 91 %   O2 Device PAP (positive airway pressure) High flow nasal cannula   FiO2  35 %  --    O2 Flow Rate (L/min)  --  10 L/min   D: Patient taken off of BiPAP for short break and to administer PO medications. She is oriented times 4. Speech is slurred but she has her upper teeth out. She verbalized that she is feeling better off of the biPAP. She passed a 3 oz swallow eval. Patient left on high flow nasal oxygen at this time. Called and updated RT.  Flavia Veras

## 2020-10-22 NOTE — H&P
History and Physical      Chief Complaint   Patient presents with    Shortness of Breath     started approx 3 days ago. sat per squad sat was initially 68 placed on oxygen at 10 and hhn x 1 and sat 96         HISTORY OF PRESENT ILLNESS:     Patient is an 68-year-old white female who is seen in the ICU on BiPAP. History is somewhat limited as she is on BiPAP. Patient lives with her daughter. She has a history of COPD, diabetes mellitus type 2, hypertension, paroxysmal A. fib, gout, hyperlipidemia, chronic back pain who has been short of breath for the last 3 days. She complained of leg edema. She denied any chest pain. When she came to the emergency room she was in respiratory distress with a pulse ox of 68%. She was placed on 10 L nasal cannula and she got breathing treatment and her oxygen saturation improved to 96%. Patient is on 4 L of oxygen at home continuously. Patient was placed on BiPAP. Work-up showed elevated troponin. Chest x-ray consistent with CHF. BNP was elevated. She has chronic combined CHF. Patient is admitted the ICU. Rapid Covid test was done. This is negative. Patient is allergic to penicillins.     Past Medical History:   Diagnosis Date    COPD [facility]ronic [facility] pulmonary disease) ([facility] [address]     Diabetes mellitus (HCC)     Gout     Hyperlipidemia     Hypertension     Paroxysmal A-fib (HCC)        Past Surgical History:   Procedure Laterality Date    HYSTERECTOMY         Scheduled Meds:   allopurinol  100 mg Oral Daily    dilTIAZem  240 mg Oral Daily    furosemide  40 mg Intravenous BID    insulin glargine  24 Units Subcutaneous BID    insulin lispro  0-6 Units Subcutaneous TID WC    insulin lispro  0-3 Units Subcutaneous Nightly    oxybutynin  10 mg Oral Daily    rivaroxaban  15 mg Oral Daily    sodium chloride flush  10 mL Intravenous 2 times per day    aspirin  81 mg Oral Daily    mupirocin   Nasal BID       Continuous Infusions:   dextrose PRN Meds:  glucose, dextrose, glucagon (rDNA), dextrose, sodium chloride flush, acetaminophen **OR** acetaminophen, polyethylene glycol, promethazine **OR** ondansetron       reports that she has never smoked. She has never used smokeless tobacco.    History reviewed. No pertinent family history. Social History     Socioeconomic History    Marital status:      Spouse name: None    Number of children: None    Years of education: None    Highest education level: None   Occupational History    None   Social Needs    Financial resource strain: None    Food insecurity     Worry: None     Inability: None    Transportation needs     Medical: None     Non-medical: None   Tobacco Use    Smoking status: Never Smoker    Smokeless tobacco: Never Used   Substance and Sexual Activity    Alcohol use: No    Drug use: No    Sexual activity: None   Lifestyle    Physical activity     Days per week: None     Minutes per session: None    Stress: None   Relationships    Social connections     Talks on phone: None     Gets together: None     Attends Christianity service: None     Active member of club or organization: None     Attends meetings of clubs or organizations: None     Relationship status: None    Intimate partner violence     Fear of current or ex partner: None     Emotionally abused: None     Physically abused: None     Forced sexual activity: None   Other Topics Concern    None   Social History Narrative    None     REVIEW OF SYSTEMS:     See HPI. Review of systems limited as she is on BiPAP. VS:   /62   Pulse 86   Temp 98.3 °F (36.8 °C) (Oral)   Resp 20   Ht 5' 2\" (1.575 m)   Wt 210 lb (95.3 kg)   SpO2 97%   BMI 38.41 kg/m²     Gen: Is awake and alert. She is on BiPAP. Sick appearing. .   Eyes: PERRL. No sclera icterus. No conjunctival injection. ENT: No discharge. Pharynx clear. Neck: Trachea midline. Normal thyroid. Resp:+ accessory muscle use. + crackles.  No Mild mitral regurgitation. Left atrium is of normal size. Aortic valve appears sclerotic but opens adequately. Mild aortic valve regurgitation. Normal right ventricular size. Right ventricular systolic function is mildly reduced. Systolic pulmonic artery pressure (SPAP) is estimated at mmHg 46 consistent   with mild pulmonary hypertension (Right atrial pressure of 8 mmHg). Signature      ------------------------------------------------------------------   Electronically signed by Louise Damon MD, [facility] (Interpreting   physician) on 05/24/2018 at 11:01 AM   ------------------------------------------------------------------      ASSESSMENT:    Principal Problem:    Acute respiratory failure with hypoxemia ([address]  Active Problems:    Essential hypertension    ILD (interstitial lung disease) ([facility] [address]    Chronic obstructive pulmonary disease (HCC)    Anxiety    Chronic kidney disease (CKD) stage G3b/A1, moderately decreased glomerular filtration rate (GFR) between 30-44 mL/min/1.73 square meter and albuminuria creatinine ratio less than 30 mg/g    Elevated troponin  Resolved Problems:    * No resolved hospital problems. *      PLAN:    #Acute on chronic hypoxic respiratory failure. Patient came to the emergency room with shortness of breath and respiratory distress. Her oxygen saturation was low. She is placed on 10 L of oxygen. Subsequently she is placed on BiPAP. The most likely cause of respiratory failure is exacerbation of her heart failure. Continue BiPAP for now. Pulmonary consultation. Admitted the ICU. We will try to take her off the BiPAP and placed on nasal cannula oxygen to see how she does. #Acute on chronic combined heart failure. Insistent with congestive heart failure. Chest x-ray shows pulmonary vascular congestion and clinical exam shows crackles. She has edema of her legs. We will start her on IV Lasix. Strict I's and O's. #Elevated troponin.   Could be NSTEMI versus demand ischemia. Cardiology consultation has been obtained. No interventions planned. Initial EKG showed accelerated junctional rhythm. Presently she seems to be normal sinus rhythm. #Paroxysmal atrial fibrillation. She has a history of paroxysmal A. fib. She is on Xarelto at home. #CKD. Monitor creatinine closely. He has stage IIIb CKD. #COPD and interstitial lung disease. Use breathing treatments. COVID-19 is negative. #Anxiety. Appears to be stable. #Hypertension. She takes Diovan and Cardizem at home. #Xarelto for DVT prophylaxis. Comment: Please note this report has been produced using speech recognition software and may contain errors related to that system including errors in grammar, punctuation, and spelling, as well as words and phrases that may be inappropriate. If there are any questions or concerns please feel free to contact the dictating provider for clarification. All questions and concerns were addressed to the patient/family. Alternatives to my treatment were discussed. The note was completed using EMR. Every effort was made to ensure accuracy; however, inadvertent computerized transcription errors may be present.                 Jeromy Luna 10/22/2020 9:07 AM

## 2020-10-22 NOTE — PROGRESS NOTES
Patient handoff given to nurse Anyi Velasco, patient resting with eyes closed at handoff.  Patricia Emmanuel RN

## 2020-10-22 NOTE — PROGRESS NOTES
Patient resting at time of handoff, bipap in place no needs expressed, patient awaiting ECHO today.  Fay Lopez RN'

## 2020-10-22 NOTE — CONSULTS
Reason for referral and CC: SOB, CHF    HISTORY OF PRESENT ILLNESS: 5year-old female with progressive shortness of breath over 3 days. She presented to the emergency room for the shortness of breath as well as lower extremity edema. She has not have oxygen at home but was found to be hypoxic. She was admitted to the ICU on BiPAP due to hypoxia. Past Medical History:   Diagnosis Date    [facility] disease) [facility]     Diabetes mellitus ([facility]     Gout     Hyperlipidemia     Hypertension     Paroxysmal A-fib (HCC)      Past Surgical History:   Procedure Laterality Date    HYSTERECTOMY       Family History  family history is not on file. Social History:  reports that she has never smoked. She has never used smokeless tobacco.   reports no history of alcohol use. ALLERGIES:  Patient is allergic to penicillins. Continuous Infusions:   dextrose       Scheduled Meds:   allopurinol  100 mg Oral Daily    dilTIAZem  240 mg Oral Daily    furosemide  40 mg Intravenous BID    insulin glargine  24 Units Subcutaneous BID    insulin lispro  0-6 Units Subcutaneous TID WC    insulin lispro  0-3 Units Subcutaneous Nightly    oxybutynin  10 mg Oral Daily    rivaroxaban  15 mg Oral Daily    sodium chloride flush  10 mL Intravenous 2 times per day    aspirin  81 mg Oral Daily    mupirocin   Nasal BID     PRN Meds:  glucose, dextrose, glucagon (rDNA), dextrose, sodium chloride flush, acetaminophen **OR** acetaminophen, polyethylene glycol, promethazine **OR** ondansetron      PHYSICAL EXAM: /62   Pulse 86   Temp 98.3 °F (36.8 °C) (Oral)   Resp 20   Ht 5' 2\" (1.575 m)   Wt 210 lb (95.3 kg)   SpO2 97%   BMI 38.41 kg/m²  on45% bipap  Constitutional:  No acute distress. Eyes: PERRL. Conjunctivae anicteric. ENT: Normal nose. Normal tongue. Neck:  Trachea is midline. No thyroid tenderness. Respiratory: + accessory muscle usage. No wheezes. + rales.  No procedures.     Thank you Alexx Doty, * for this consult

## 2020-10-22 NOTE — PROGRESS NOTES
Spoke with the patient for her admission questions. She verbalized that she does not want CPR, Defibrillation, intubation or IV resuscitative medications. She is currently on BiPAP but is alert and oriented times 4. Called and spoke with the patients Elizabeth Hunter and she verbalized that the patient does not want CPR, Defibrillation, intubation or IV resuscitative medications. Elodia Fernando also verbalized that the patient does not want any arterial blood gases or sticks from her wrist as they were too painful. Spoke with Dr. Ca Joseph. Code status updated in [facility]

## 2020-10-22 NOTE — ED PROVIDER NOTES
Emergency Physician Note  [address] [facility] ED  [address] [address]  Dept: [phone]  Loc: [phone]  Open Note Time:  10:46 PM    Chief Complaint  Shortness of Breath (started approx 3 days ago. sat per squad sat was initially 76 placed on oxygen at 10 and hhn x 1 and sat 96)       History of Present Illness  Peter Canchola is a 80 y.o. female  has a past medical history of COPD (chronic obstructive pulmonary disease) ([facility] [facility] Diabetes mellitus ([facility] [address] Gout, Hyperlipidemia, Hypertension, and Paroxysmal A-fib ([facility] [address] who presents to the ED for this of breath. Patient reports increasing shortness of breath for the past several days and increasing bilateral lower extremity edema. Initially she reported that she does not use oxygen at home however later she clarified that she is on 4 L nasal cannula continuously. She denies chest pain. EMS reports when they first arrived she was initially at 68% and they placed her on 10 L nasal cannula and gave her 1 nebulizer treatment and her saturations improved to 96%. Denies fever,  chest pain,  cough, abdominal pain, nausea, vomiting, diarrhea. No palliative/provocative factors. Unless otherwise stated in this report or unable to obtain because of the patient's clinical or mental status as evidenced by the medical record, this patient's positive and negative responses for review of systems, constitutional, psych, eyes, ENT, cardiovascular, respiratory, gastrointestinal, neurological, genitourinary, musculoskeletal, integument systems and systems related to the presenting problem are either stated in the preceding paragraph or were not pertinent or were negative for the symptoms and/or complaints related to the medical problem. I have reviewed the following from the nursing documentation:      Prior to Admission medications    Medication Sig Start Date End Date Taking?  Authorizing Provider   oxybutynin (DITROPAN-XL) 10 MG extended release tablet TAKE 1 TABLET BY MOUTH DAILY 10/5/20   PAM Melo CNP   clorazepate (TRANXENE) 7.5 MG tablet TAKE 1 TABLET BY MOUTH EVERY NIGHT 10/5/20 1/3/21  PAM Melo CNP   blood glucose test strips (ONETOUCH ULTRA) strip 1 each by Does not apply route daily DMll, E11.9 10/2/20   Stevo Plant, APRN - CNP   allopurinol (ZYLOPRIM) 100 MG tablet TAKE 1 TABLET BY MOUTH EVERY DAY 9/29/20   Jay Nagel MD   valsartan (DIOVAN) 40 MG tablet TAKE 1 TABLET BY MOUTH DAILY 9/3/20   Ranjit Suh MD   levalbuterol Mary Sulma) 1.25 MG/3ML nebulizer solution USE 1 NEBULE QID PRN 8/24/20   Jay Nagel MD   dilTIAZem (CARDIZEM CD) 240 MG extended release capsule TAKE 1 CAPSULE BY MOUTH DAILY 8/3/20   Jay Nagel MD   rivaroxaban (XARELTO) 15 MG TABS tablet TAKE 1 TABLET BY MOUTH EVERY MORNING WITH BREAKFAST 8/3/20   Jay Nagel MD   furosemide (LASIX) 40 MG tablet Take 1.5 tablets by mouth daily 7/6/20 10/4/20  Jay Nagel MD   LANTUS SOLOSTAR 100 UNIT/ML injection pen ADMINISTER 24 UNITS UNDER THE SKIN TWICE DAILY 6/2/20   Jay Nagel MD   Lancet Devices (ONE TOUCH Faith Brakeman LANCING DEV) [address] 1 each by Does not apply route daily 12/16/19   Jay Nagel MD   Blood Glucose Monitoring Suppl (ONE TOUCH ULTRA MINI) w/Device KIT 1 kit by Does not apply route daily 9/18/18   Jay Nagel MD   levalbuterol Mary Sulma HFA) 45 MCG/ACT inhaler INHALE 2 PUFFS INTO THE LUNGS EVERY 4 HOURS AS NEEDED FOR WHEEZING 8/17/18 4/30/20  Lou Jeffries MD   Handicap Placard MISC by Does not apply route Unable to walk distance, expiration 5 years 6/19/18   Jay Nagel MD   CALCIUM PO Take by mouth    Historical Provider, MD   CPAP Machine MISC by Does not apply route nightly    Historical Provider, MD       Allergies as of 10/21/2020 - Review Complete 10/21/2020   Allergen Reaction Noted    Penicillins  10/20/2015       Past Medical History:   Diagnosis Date  COPD (chronic obstructive pulmonary disease) (HCC)     Diabetes mellitus ([facility]     Gout     Hyperlipidemia     Hypertension     Paroxysmal A-fib (HCC)         Surgical History:   Past Surgical History:   Procedure Laterality Date    HYSTERECTOMY          Family History:  History reviewed. No pertinent family history. Social History     Socioeconomic History    Marital status:      Spouse name: Not on file    Number of children: Not on file    Years of education: Not on file    Highest education level: Not on file   Occupational History    Not on file   Social Needs    Financial resource strain: Not on file    Food insecurity     Worry: Not on file     Inability: Not on file    Transportation needs     Medical: Not on file     Non-medical: Not on file   Tobacco Use    Smoking status: Never Smoker    Smokeless tobacco: Never Used   Substance and Sexual Activity    Alcohol use: No    Drug use: No    Sexual activity: Not on file   Lifestyle    Physical activity     Days per week: Not on file     Minutes per session: Not on file    Stress: Not on file   Relationships    Social connections     Talks on phone: Not on file     Gets together: Not on file     Attends Alevism service: Not on file     Active member of club or organization: Not on file     Attends meetings of clubs or organizations: Not on file     Relationship status: Not on file    Intimate partner violence     Fear of current or ex partner: Not on file     Emotionally abused: Not on file     Physically abused: Not on file     Forced sexual activity: Not on file   Other Topics Concern    Not on file   Social History Narrative    Not on file       Nursing notes reviewed.     ED Triage Vitals [10/21/20 2234]   Enc Vitals Group      BP (!) 115/53      Pulse 114      Resp (!) 32      Temp 98.3 °F (36.8 °C)      Temp Source Oral      SpO2 99 %      Weight 210 lb (95.3 kg)      Height 5' 2\" (1.575 m)      Head Circumference Peak Flow       Pain Score       Pain Loc       Pain Edu? Excl. in [address] GENERAL:      Obese, Awake, alert. Well developed, well nourished with apparent distress. toxic-appearing, ill-appearing. HENT:    Normocephalic, Atraumatic, no lacerations. No ENT exam due to PPE. EYES:    Conjunctiva normal,   Pupils equal round and reactive to light,   Extraocular movements normal.  NECK:      Trachea is midline. No stridor. CHEST:      Levator rate and regular rhythm, no murmurs/rubs/gallops,   normal S1/S2, chest wall non-tender. LUNGS:      Moderate respiratory distress. +abdominal retractions, +sternal retractions. Minutes breath sounds bilaterally with shallow breathing, expiratory wheezing, no rhochi, no rales  Not Speaking comfortably in full sentences  ABDOMEN:      Soft, non-tender, non-distended. No guarding. No rebound. EXTREMITIES:     Moves extremities x4 with purpose. Normal range of motion, mild pitting edema of the feet and ankle,   No tenderness, no deformity,   distal pulses present and equal bilaterally. SKIN:      Warm, dry and intact. Pallor  NEUROLOGIC:    Normal mental status. Moving all extremities to command. Alert and oriented x4   without focal motor deficit or gross sensory deficit. Normal speech. PSYCHIATRIC:    anxious,   normal mood and affect,   thoughts are linear and organized,   without delusions/hallucinations,   Not responding to internal stimuli,  responds appropriately to questions    LABS and DIAGNOSTIC RESULTS  EKG  The Ekg interpreted by me shows  sinus tachycardia, nxlx=409 with a rate of 116  Axis is   Normal  QTc is  within an acceptable range  Intervals and Durations are unremarkable. ST Segments: nonspecific changes  Delta waves, Brugada Syndrome, and Short CA are not present. Prior EKG to compare with was available.  No significant changes compared to prior EKG from July 11, 2018, of note patient was in atrial fibrillation on previous EKG        RADIOLOGY  X-RAYS:  I have reviewed radiologic plain film image(s). ALL OTHER NON-PLAIN FILM IMAGES SUCH AS CT, ULTRASOUND AND MRI HAVE BEEN READ BY THE RADIOLOGIST. XR CHEST PORTABLE   Final Result   CHF findings with cardiomegaly, vascular congestion and small bilateral   pleural effusions. Superimposed pneumonia cannot be excluded              PROCEDURES    MEDICAL DECISION MAKING    Procedures/interventions/images ordered for this visit  Orders Placed This Encounter   Procedures    Culture, Blood 1    Culture, Blood 2    Rapid influenza A/B antigens    XR CHEST PORTABLE    Procalcitonin    CBC auto differential    Comprehensive Metabolic Panel w/ Reflex to MG    Lactate, Sepsis    Troponin    COVID-19    PPE Instructions    Droplet Plus Isolation    Initiate Oxygen Therapy Protocol    EKG 12 lead       Medications ordered for this visit  Orders Placed This Encounter   Medications    0.9 % sodium chloride IV bolus 2,859 mL    OR Linked Order Group     acetaminophen (TYLENOL) tablet 650 mg     acetaminophen (TYLENOL) suppository 650 mg       ED course notes for this visit  ED Course as of Oct 22 0309   Thu Oct 22, 2020   0025 Went back in the room, patient is currently on the BiPAP, his heart rate has significantly improved to 104 she had oxygen saturations of 93 she is breathing much more regularly and without tachypnea. Patient reports she is feeling better. Patient is aware that I am going to get her admitted    [SG]      ED Course User Index  [SG] Usha Moise MD     REVIEW OF PREVIOUS RECORDS  I have reviewed the old records of Mónica Chapin which reveal the following pertinent information:    Echocardiogram complete 2D with doppler with color   Transthoracic Echocardiography Report (TTE)    Date of Study      05/24/2018        Gender              Female      Type of Study      TTE procedure:ECHOCARDIOGRAM COMPLETE 2D W DOPPLER W COLOR. Procedure Date  Date: 05/24/2018 Start: 07:47 AM   Indications:Atrial fibrillation.      Conclusions      Summary   Atrial fibrillations with rapid ventricular response. Definity contrast is   used. Mild anterolateral wall hypokinesia. EF estimated 45-50%. Normal left ventricular size with moderate concentric left ventricular   hypertrophy. Left ventricular diastolic filling pressure is elevated. Moderate mitral annular calcification. No mitral stenosis. Mild mitral regurgitation. Left atrium is of normal size. Aortic valve appears sclerotic but opens adequately. Mild aortic valve regurgitation. Normal right ventricular size. Right ventricular systolic function is mildly reduced. Systolic pulmonic artery pressure (SPAP) is estimated at mmHg 46 consistent   with mild pulmonary hypertension (Right atrial pressure of 8 mmHg). Signature      ------------------------------------------------------------------   Electronically signed by Alida Ragsdale MD, [facility] (Interpreting   physician) on 05/24/2018 at 11:01 AM   ------------------------------------------------------------------    I wore  surgical mask, and gloves when I evaluated the patient. I evaluated the patient in room 01/01      SEP-1 CORE MEASURE DATA    Classification: exclude from core measure    Exclusion criteria: the patient is NOT to be included for sepsis due to:   Alternative explanation for abnormal vital signs/labs, specifically Tachycardia and tachypnea appears to be related to Congestive heart failure and not sepsis, elevated troponin appears to be due to ischemic demand and not sepsis    Results for orders placed or performed during the hospital encounter of 10/21/20   Rapid influenza A/B antigens    Specimen: Nasopharyngeal   Result Value Ref Range    Rapid Influenza A Ag Negative Negative    Rapid Influenza B Ag Negative Negative   Procalcitonin   Result Value Ref Range    Procalcitonin 0.08 0.00 - 0.15 ng/mL   CBC auto differential   Result Value Ref Range    WBC 12.5 (H) 4.0 - 11.0 K/uL    RBC 4.69 4.00 - 5.20 M/uL    Hemoglobin 12.2 12.0 - 16.0 g/dL    Hematocrit 38.5 36.0 - 48.0 %    MCV 82.1 80.0 - 100.0 fL    MCH 25.9 (L) 26.0 - 34.0 pg    MCHC 31.6 31.0 - 36.0 g/dL    RDW 17.1 (H) 12.4 - 15.4 %    Platelets 435 602 - 610 K/uL    MPV 8.2 5.0 - 10.5 fL    Neutrophils % 90.1 %    Lymphocytes % 6.3 %    Monocytes % 2.7 %    Eosinophils % 0.3 %    Basophils % 0.6 %    Neutrophils Absolute 11.3 (H) 1.7 - 7.7 K/uL    Lymphocytes Absolute 0.8 (L) 1.0 - 5.1 K/uL    Monocytes Absolute 0.3 0.0 - 1.3 K/uL    Eosinophils Absolute 0.0 0.0 - 0.6 K/uL    Basophils Absolute 0.1 0.0 - 0.2 K/uL   Comprehensive Metabolic Panel w/ Reflex to MG   Result Value Ref Range    Sodium 137 136 - 145 mmol/L    Potassium reflex Magnesium 4.6 3.5 - 5.1 mmol/L    Chloride 98 (L) 99 - 110 mmol/L    CO2 28 21 - 32 mmol/L    Anion Gap 11 3 - 16    Glucose 218 (H) 70 - 99 mg/dL    BUN 30 (H) 7 - 20 mg/dL    CREATININE 1.2 0.6 - 1.2 mg/dL    GFR Non-African American 42 (A) >60    GFR  51 (A) >60    Calcium 10.2 8.3 - 10.6 mg/dL    Total Protein 7.5 6.4 - 8.2 g/dL    Alb 3.9 3.4 - 5.0 g/dL    Albumin/Globulin Ratio 1.1 1.1 - 2.2    Total Bilirubin 0.4 0.0 - 1.0 mg/dL    Alkaline Phosphatase 125 40 - 129 U/L    ALT 18 10 - 40 U/L    AST 34 15 - 37 U/L    Globulin 3.6 g/dL   Lactate, Sepsis   Result Value Ref Range    Lactic Acid, Sepsis 1.4 0.4 - 1.9 mmol/L   Troponin   Result Value Ref Range    Troponin 0.64 (HH) <0.01 ng/mL   Blood gas, venous   Result Value Ref Range    pH, Matthew 7.270 (L) 7.350 - 7.450    pCO2, Matthew 48.7 40.0 - 50.0 mmHg    pO2, Matthew 152.9 (H) 25.0 - 40.0 mmHg    HCO3, Venous 21.9 (L) 23.0 - 29.0 mmol/L    Base Excess, Matthew -5.2 (L) -3.0 - 3.0 mmol/L    O2 Sat, Matthew 99 Not Established %    Carboxyhemoglobin 3.0 (H) 0.0 - 1.5 %    MetHgb, Matthew 0.3 <1.5 %    TC02 (Calc), Matthew 23 Not Established mmol/L    O2 Content, Matthew 18 Not Established VOL %    O2 Therapy Unknown    Troponin   Result Value Ref Range    Troponin 0.66 (HH) <0.01 ng/mL   Brain Natriuretic Peptide   Result Value Ref Range    Pro-BNP 8,863 (H) 0 - 449 pg/mL   COVID-19   Result Value Ref Range    SARS-CoV-2, NAAT Not Detected Not Detected   EKG 12 lead   Result Value Ref Range    Ventricular Rate 116 BPM    Atrial Rate 116 BPM    QRS Duration 102 ms    Q-T Interval 330 ms    QTc Calculation (Bazett) 458 ms    R Axis 71 degrees    T Axis 79 degrees    Diagnosis       Accelerated Junctional rhythmNonspecific ST abnormalityAbnormal ECGWhen compared with ECG of 11-JUL-2018 14:29,Junctional rhythm has replaced Atrial fibrillationQRS axis Shifted leftNonspecific T wave abnormality no longer evident in Inferior leads     To admission I did speak with Dr. Elena Olvera from cardiology, she agrees with this patient did not need to be transferred. In regards to the elevated troponin, I believe this is ischemic demand given that the patient been short of breath for 2 days and she had significantly elevated heart rate and respiratory rate upon arrival.  Also with a CHF exacerbation. Also patient does not have any chest pain he does not have any changes on the EKG therefore she does not require heparinization. I spoke with Dr. Tanna Iverson. We thoroughly discussed the history, physical exam, laboratory and imaging studies, as well as, emergency department course. Based upon that discussion, we've decided to admit Junious Kill for further observation and evaluation of Jasmyne Quintero's dyspnea. As I have deemed necessary from their history, physical, and studies, I have considered and evaluated Junious Kill for the following diagnoses:  ACUTE CORONARY SYNDROME, CHRONIC OBSTRUCTIVE PULMONARY DISEASE, CONGESTIVE HEART FAILURE, PERICARDIAL TAMPONADE, PNEUMONIA, PNEUMOTHORAX, PULMONARY EMBOLISM, SEPSIS, and THORACIC DISSECTION. FINAL IMPRESSION  1.  Acute on chronic respiratory failure with hypoxia ([facility] [address]    2. Acute on chronic diastolic CHF (congestive heart failure) ([facility] [address]    3. Hypoxia        Vitals:  Blood pressure (!) 101/45, pulse 79, temperature 98.3 °F (36.8 °C), temperature source Oral, resp. rate 18, height 5' 2\" (1.575 m), weight 210 lb (95.3 kg), SpO2 96 %. Disposition    Pt is in fair condition upon Admit to CCU/ICU. Please note, critical care time was at least 45 minutes, obtaining history, conducting a physical exam, performing and monitoring interventions, ordering, collecting and interpreting tests, and establishing medical decision-making and discussion with the patient and/or family, specifically for management of the presenting complaint and symptoms initially, direct bedside care, reevaluation, review of records, and consultation. There was a high probability of clinically significant life-threatening deterioration in the patient's condition, which required my urgent intervention. This time does not include separately billable procedures. The note was completed using Dragon voice recognition transcription. Every effort was made to ensure accuracy; however, inadvertent transcription errors may be present despite my best efforts to edit errors.     Usha Moise MD  [address]  10/22/20 [address] [place] [address] Aarti Fiore MD  10/22/20 2394

## 2020-10-22 NOTE — PLAN OF CARE
Patient's EF (Ejection Fraction) is greater than 40%    Patient's weights and intake/output reviewed:    Patient's Last Weight: 210lbs obtained by  stated . Difference of 0 lbs  than last documented weight. Intake/Output Summary (Last 24 hours) at 10/22/2020 1509  Last data filed at 10/22/2020 1301  Gross per 24 hour   Intake 777 ml   Output 1350 ml   Net -573 ml         Pt is currently on BiPAP. Pt with complaints of shortness of breath. Pt with pitting lower extremity edema. Patient and/or Family's stated Goal of Care this Admission: reduce shortness of breath, increase activity tolerance, better understand heart failure and disease management, be more comfortable, reduce lower extremity edema, and unable to assess, will attempt again prior to discharge      Comorbidities Reviewed Yes  Patient has a past medical history of COPD (chronic obstructive pulmonary disease) ([address] Diabetes mellitus ([address] Gout, Hyperlipidemia, Hypertension, and Paroxysmal A-fib ([facility] [address]          >>For CHF and Comorbidity documentation on Education Time and Topics, please see Education Tab

## 2020-10-22 NOTE — PROGRESS NOTES
4 Eyes Skin Assessment     The patient is being assess for   Admission    I agree that 2 RN's have performed a thorough Head to Toe Skin Assessment on the patient. ALL assessment sites listed below have been assessed. Areas assessed by both nurses:   [x]   Head, Face, and Ears   [x]   Shoulders, Back, and Chest, Abdomen  [x]   Arms, Elbows, and Hands   [x]   Coccyx, Sacrum, and Ischium  [x]   Legs, Feet, and Heels        She has blanchable erythema on her Coccyx and heels. Scattered small bruises on her extremities. Redness in her     **SHARE this note so that the co-signing nurse is able to place an eSignature**    Co-signer eSignature: Electronically signed by Raul Campos RN on 10/22/20 at 3:10 PM EDT    Does the Patient have Skin Breakdown?   No          Mian Prevention initiated:  No   Wound Care Orders initiated:  No      WOC nurse consulted for Pressure Injury (Stage 3,4, Unstageable, DTI, NWPT, Complex wounds)and New or Established Ostomies:  No      Primary Nurse eSignature: Electronically signed by Rommel Pritchard RN on 10/22/20 at 1:12 PM EDT

## 2020-10-22 NOTE — PROGRESS NOTES
10/22/20 1455   NIV Type   Skin Assessment Clean, dry, & intact   Skin Protection for O2 Device Yes   Equipment Type v60   Mode Bilevel   Mask Type Full face mask   Mask Size Medium   Settings/Measurements   IPAP 12 cmH20   CPAP/EPAP 6 cmH2O   Rate Ordered 14   Resp 26   Insp Rise Time (%) 2 %   FiO2  35 %   I Time/ I Time % 9 s   Vt Exhaled 505 mL   Minute Volume 15.7 Liters   Mask Leak (lpm) 0 lpm   Comfort Level Good   Using Accessory Muscles No   Breath Sounds   Right Upper Lobe Diminished   Right Middle Lobe Diminished   Right Lower Lobe Crackles   Left Upper Lobe Diminished   Left Lower Lobe Crackles   Alarm Settings   Alarms On Y   Press Low Alarm 8 cmH2O   High Pressure Alarm 40 cmH2O   Apnea (secs) 20 secs   Resp Rate Low Alarm 12   High Respiratory Rate 40 br/min

## 2020-10-22 NOTE — PROGRESS NOTES
10/22/20 1938   NIV Type   $NIV $Daily Charge   Equipment Type V60   Mode Bilevel   Mask Type Full face mask   Mask Size Medium   Settings/Measurements   IPAP 12 cmH20   CPAP/EPAP 6 cmH2O   Rate Ordered 14   Resp 23   FiO2  40 %   Vt Exhaled 321 mL   Minute Volume 8.9 Liters   Mask Leak (lpm) 2 lpm   Comfort Level Good   Using Accessory Muscles No   SpO2 92   Breath Sounds   Right Upper Lobe Diminished   Right Middle Lobe Diminished   Right Lower Lobe Crackles   Left Upper Lobe Diminished   Left Lower Lobe Crackles   Alarm Settings   Alarms On Y   Press Low Alarm 8 cmH2O   High Pressure Alarm 40 cmH2O   Delay Alarm 20 sec(s)   Resp Rate Low Alarm 12   High Respiratory Rate 40 br/min

## 2020-10-22 NOTE — PROGRESS NOTES
10/21/20 4984   NIV Type   Skin Assessment Clean, dry, & intact   Skin Protection for O2 Device Yes   NIV Started/Stopped On   Equipment Type V60   Mode Bilevel   Mask Type Full face mask   Mask Size Medium   Settings/Measurements   IPAP 16 cmH20   CPAP/EPAP 8 cmH2O   Rate Ordered 14   Resp 24   FiO2  45 %   Vt Exhaled 564 mL   Mask Leak (lpm) 21 lpm   Comfort Level Good   Using Accessory Muscles No   SpO2 95   Breath Sounds   Right Upper Lobe Diminished   Right Middle Lobe Diminished   Right Lower Lobe Diminished   Left Upper Lobe Diminished   Left Lower Lobe Diminished   Patient Observation   Observations SPO2  95%  on 45%  FIO2  BIPAP.     Alarm Settings   Alarms On Y

## 2020-10-23 NOTE — PROGRESS NOTES
Pulmonary Progress Note  CC: SOB, CHF    Subjective:  Less SOB, used Bipap overnight. EXAM: /61   Pulse 91   Temp 97.8 °F (36.6 °C) (Axillary)   Resp 24   Ht 5' 2\" (1.575 m)   Wt 210 lb (95.3 kg)   SpO2 97%   BMI 38.41 kg/m²  on 8lpm  Constitutional:  No acute distress. Eyes: PERRL. Conjunctivae anicteric. ENT: Normal nose. Normal tongue. Neck:  Trachea is midline. No thyroid tenderness. Respiratory: No accessory muscle usage. No wheezes. + rales. No Rhonchi. Cardiovascular: Normal S1S2. No digit clubbing. No digit cyanosis. No LE edema. Psychiatric: No anxiety or Agitation. Alert and Oriented to person, place and time.     Scheduled Meds:   allopurinol  100 mg Oral Daily    furosemide  40 mg Intravenous BID    insulin lispro  0-6 Units Subcutaneous TID WC    insulin lispro  0-3 Units Subcutaneous Nightly    oxybutynin  10 mg Oral Daily    rivaroxaban  15 mg Oral Daily    sodium chloride flush  10 mL Intravenous 2 times per day    aspirin  81 mg Oral Daily    mupirocin   Nasal BID    insulin glargine  20 Units Subcutaneous BID    ipratropium-albuterol  1 ampule Inhalation Q4H WA    metoprolol tartrate  12.5 mg Oral BID     Continuous Infusions:   dextrose       PRN Meds:  glucose, dextrose, glucagon (rDNA), dextrose, sodium chloride flush, acetaminophen **OR** acetaminophen, polyethylene glycol, promethazine **OR** ondansetron, perflutren lipid microspheres, ipratropium-albuterol    Labs:  CBC:   Recent Labs     10/21/20  2316 10/23/20  0414   WBC 12.5* 9.7   HGB 12.2 11.1*   HCT 38.5 34.7*   MCV 82.1 80.7    228     BMP:   Recent Labs     10/21/20  2316 10/23/20  0414    140   K 4.6 4.2   CL 98* 104   CO2 28 27   BUN 30* 37*   CREATININE 1.2 1.4*       Cultures:    Films:  Chest imaging was reviewed by me and showed 10/21/20  CHF findings with cardiomegaly, vascular congestion and small bilateral    pleural effusions.  Superimposed pneumonia cannot be excluded

## 2020-10-23 NOTE — PROGRESS NOTES
[address]   Progress Note  Cardiology      HPI: Seeing Ms. Quintero today for f/u NSTEMI and CHF. She has some cough and congestion today but reports this is baseline. No other complaints. Denies CP or SOB now. Wore bipap overnight and on 7L NC oxygen now (home 4.5L NC). Daughter at bedside. Physical Examination:    Vitals:    10/23/20 0600   BP: 125/61   Pulse: 91   Resp: 24   Temp:    SpO2: 97%          Constitutional and General Appearance: NAD   Respiratory:  · Normal excursion and expansion without use of accessory muscles  · Resp Auscultation: Soft breath sounds  Cardiovascular:  · The apical impulses not displaced  · Heart tones are crisp and normal  · Cervical veins are not engorged  · The carotid upstroke is normal in amplitude and contour without delay or bruit  · Normal S1S2, No S3, No Murmur  · Peripheral pulses are symmetrical and full  · There is no clubbing, cyanosis of the extremities.   · No edema  · Pedal Pulses: 2+ and equal   Abdomen:  · No masses or tenderness  · Liver/Spleen: No Abnormalities Noted  Neurological/Psychiatric:  · Alert and oriented in all spheres  · Moves all extremities well  · No abnormalities of mood, affect, memory, mentation, or behavior are noted  Skin: warm and dry    Lab Results   Component Value Date    WBC 9.7 10/23/2020    HGB 11.1 (L) 10/23/2020    HCT 34.7 (L) 10/23/2020    MCV 80.7 10/23/2020     10/23/2020     Lab Results   Component Value Date    CREATININE 1.4 (H) 10/23/2020    BUN 37 (H) 10/23/2020     10/23/2020    K 4.2 10/23/2020     10/23/2020    CO2 27 10/23/2020     Lab Results   Component Value Date    INR 2.00 (H) 07/11/2018    PROTIME 22.8 (H) 07/11/2018      EKG: 10/21/20 I have reviewed EKG with the following interpretation:  Impression:  See HPI Accelerated Junctional rhythmNonspecific ST abnormalityNon-specific intra-ventricular conduction delayWhen compared with ECG of 11-JUL-2018 14:29,Junctional rhythm has replaced Atrial fibrillationConfirmed by Zulema Jacome MD, [address] [place] [address] ([facility]) on 10/22/2020 7:38:19 AM      CXR 10/21/20  CHF findings with cardiomegaly, vascular congestion and small bilateral pleural effusions.  Superimposed pneumonia cannot be excluded   FINDINGS: Cardiomegaly with vascular congestion, small bilateral pleural effusions and bibasilar airspace disease.  No pneumothorax or free air.      Echo 4/11/2017:  Normal left ventricular systolic function with an estimated ejection fraction of 55%.   There is mild concentric left ventricular hypertrophy.   Elevated left ventricular diastolic filling pressure.   The right ventricle is moderately enlarged.   The right atrium is moderately dilated.   Mild mitral annular calcification. Jenny Parkinson tricuspid regurgitation.   Systolic pulmonary artery pressure (SPAP) is estimated at 64 mmHg consistent with severe pulmonary hypertension (RA pressure 3 mmHg).    Echo 5/24/2018:  Atrial fibrillations with rapid ventricular response. Definity contrast is used. Mild anterolateral wall hypokinesia. EF estimated 45-50%.   Normal left ventricular size with moderate concentric left ventricular  hypertrophy.   Left ventricular diastolic filling pressure is elevated. Moderate mitral annular calcification. No mitral stenosis. Mild mitral regurgitation. Left atrium is of normal size.   Aortic valve appears sclerotic but opens adequately. Mild aortic valve regurgitation.   Normal right ventricular size.   Right ventricular systolic function is mildly reduced. Systolic pulmonic artery pressure (SPAP) is estimated at mmHg 46 consistent with mild pulmonary hypertension (Right atrial pressure of 8 mmHg). ECHO 89/28/36   LV systolic function is mildly reduced with an estimated EF of 45-50%. Endocardium not entirely well visualized but no obvious segmental wall   motion abnormalities. There is moderate concentric left ventricular hypertrophy.    Elevated left ventricular diastolic filling pressure. Mild mitral annular calcification. The left atrium is at the upper limits of normal in size. Moderate right-sided chamber enlargement. Aortic valve appears sclerotic but opens adequately. Mild mitral regurgitation. Mild-moderate tricuspid regurgitation. Systolic pulmonary artery pressure (SPAP) is estimated at [facility] c/w severe   pulmonary hypertension (RAP of 8 mmHg). Assessment:  Armaan Pitts is a 80 y.o. patient who presented to [facility] 10/22/20 with c/o swelling and SOB. She has seen Dr. Rocky Wilson in past 2017 and Dr Anish Garcia CNP,Dr Brissa Garza 2018 for Watchman evaluation. She has PMH of PAF on eliquis 15mg daily, MAT, HTN, CHF, CKD, COPD on 4L NC oxygen, DM, and gout  Most recent ECHO 5/24/2018 showed EF=45-50% (EF=55% in 4/17); Mild anterolateral HK; moderate MAC; Mild MR, AR; AV sclerosis; mild pulm HTN.                 Now she presents with c/o increasing SOB and LE swelling x 4 days. She denies any chest pain. Reports SOB both stress and rest. Hypoxic into 60's and need O2 supplementation. Admit EKG revealed Accelerated Junctional rhythm 116bpm; Nonspecific ST abnormality; IVCD (7/18 EKG afib). Note Troponin 0.64, 0.66, 0.80, 1.16. BNP 8863; CXR CHF with CM, vascular congestion and small bilateral pleural effusions. On admission O2 sats 68% admitted to ICU placed on Bipap. Covid and flu negative; BUN/Cr=30/1.2; K+ 4.6; H/H=12.2/38. 5. Diagnosis of acute CHF undetermined type and NSTEMI in elderly female with hx PAF, HTN, and COPD on chronic 4L oxygen. Currently requiring Bipap. Recs:  1. I had long d/w Ms. Quintero and daughter. She is DNR/DNI and does NOT want any invasive testing. I explained [facility] procedure which I would ideally recommend and she refuses test. Daughter agrees as well. 2. Treat conservatively medically for NSTEMI and CHF. 3. Continue xarelto 15mg qd and baby aspirin qd for AC.   4. Decreased from BID regimen to IV lasix 40mg daily given increased BUN/Cr=37/1.4 (30/1.2 yesterday)  5. Continue metoprolol 12.5mg BID (pulm doc OK with BB) and lipitor 20mg daily. 6.  I personally reviewed ECHO from 10/21/20 showing EF=45-50%; mod LVH; elevated LV diastolic filling pressure; right chamber dilation; severe pulm HTN 66mmHg  7. Consider plavix but 3 anticoagulants with high bleeding risk in elderly female and without EKG change or CP will hold for now. 8. Ween O2 to usual home regimen of 4.5L. Currently on 7L NC. Will use Bipap PRN per pulm doc.     DNR/DNI    Patient Active Problem List   Diagnosis    Multifocal atrial tachycardia (HCC)    Hyperkalemia    Essential hypertension    Acute on chronic diastolic CHF (congestive heart failure) (HCC)    Persistent atrial fibrillation (HCC)    Dyspnea and respiratory abnormalities    Acute respiratory failure with hypoxemia (HCC)    ILD [facility]interstitial lung disease) ([facility]    Pulmonary HTN (HCC)    [place] on CPAP    Hyperglycemia    Hypoxia    Acute pulmonary edema (HCC)    Altered mental status    DM (diabetes mellitus) (HCC)    Chronic obstructive pulmonary disease (HCC)    Anxiety    Abnormal finding on thyroid function test    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    Heart disease    History of pneumonia    Posterior vitreous detachment of both eyes    Posterior vitreous detachment    Renal impairment    Type 2 diabetes mellitus without complication (HCC)    Chronic kidney disease (CKD) stage G3b/A1, moderately decreased glomerular filtration rate (GFR) between 30-44 mL/min/1.73 square meter and albuminuria creatinine ratio less than 30 mg/g    Elevated troponin    Acute on chronic respiratory failure with hypoxia (HCC)    NSTEMI (non-ST elevated myocardial infarction) (HCC)    Acute congestive heart failure (HCC)    PAF (paroxysmal atrial fibrillation) ([address]

## 2020-10-23 NOTE — PROGRESS NOTES
4 Eyes Skin Assessment     The patient is being assess for   Transfer to New Unit    I agree that 2 RN's have performed a thorough Head to Toe Skin Assessment on the patient. ALL assessment sites listed below have been assessed. Areas assessed by both nurses:   [x]   Head, Face, and Ears   [x]   Shoulders, Back, and Chest, Abdomen  [x]   Arms, Elbows, and Hands   [x]   Coccyx, Sacrum, and Ischium  [x]   Legs, Feet, and Heels    No notable skin concerns, blanchable redness to bilateral heels// coccyx and some scattered bruising on bilateral upper extremities. **SHARE this note so that the co-signing nurse is able to place an eSignature**    Co-signer eSignature: Electronically signed by Miko Muller. Erica Sam RN on 10/23/20 at 7:03 PM EDT    Does the Patient have Skin Breakdown?   No          Mian Prevention initiated:  No   Wound Care Orders initiated:  No      [facility] nurse consulted for Pressure Injury (Stage 3,4, Unstageable, DTI, NWPT, Complex wounds)and New or Established Ostomies:  No      Primary Nurse eSignature: Electronically signed by Violet Butler RN on 10/23/20 at [address] PM EDT

## 2020-10-23 NOTE — PROGRESS NOTES
10/23/20 0312   NIV Type   Equipment Type V60   Mode Bilevel   Mask Type Full face mask   Mask Size Medium   Settings/Measurements   IPAP 12 cmH20   CPAP/EPAP 6 cmH2O   Rate Ordered 14   Resp 23   FiO2  40 %   Vt Exhaled 602 mL   Mask Leak (lpm) 5 lpm   Comfort Level Good   Using Accessory Muscles No   SpO2 96   Alarm Settings   Alarms On Y

## 2020-10-23 NOTE — CARE COORDINATION
Case Management Assessment  Initial Evaluation      Patient Name: Denise Hong  YOB: 1930  Diagnosis: Heart failure exacerbated by sotalol [facility]) [I50.9]  Date / Time: 10/21/2020 10:23 PM    Admission status/Date:10/22 0345 inpt  Chart Reviewed: Yes      Patient Interviewed: Yes   Family Interviewed:  No      Hospitalization in the last 30 days:  No      Health Care Decision Maker :  Primary Decision Maker:  Ramesh Yi  Child  [phone] (1st enter selection under Navigator - emergency contact- Health Care Decision Maker Relationship)   Who do you trust or have selected to make healthcare decisions for you      Met with:  pt  Interview conducted  (bedside/phone):    Current PCP: Eric Doll MD    [facility] Medicare  Precert required for SNF : Leighann Machuca          3 night stay required - Y, N    ADLS  Support Systems/Care Needs:    Transportation: daughter    Meal Preparation: daughter and self    Housing  Living Arrangements: ranch with daughter and son in law  Steps: 1  Intent for return to present living arrangements: Yes  Identified Issues:     Home Care Information  Active with [address] : No Agency:(Services)     Passport/Waiver : No  :                      Phone Number:    Passport/Waiver Services:           [phone] Provider: [facility] for Home O2 (4L)/HHN  Equipment: yes  Walker_X__Cane__X_RTS___ BSC___Shower Chair_X__Hospital Bed__X_W/C___X_Other________  02 at _4___Liter(s)---wears(frequency)___continuous____ HHN _X__ CPAP___ BiPap___   N/A____      Home O2 Use :  Yes    If No for home O2---if presently on O2 during hospitalization:  Yes  if yes CM to follow for potential DC O2 need  Informed of need for care provider to bring portable home O2 tank on day of discharge for nursing to connect prior to leaving:   Yes  Verbalized agreement/Understanding:   Yes    Community Service Affiliation  Dialysis:  No · Agency:  · Location:  · Dialysis Schedule:  · Phone:   · Fax: Other Community Services: (ex:PT/OT,Mental Health,Wound Clinic, Cardio/Pul [address])    DISCHARGE PLAN: Explained Case Management role/services. Reviewed chart. Role of discharge planner explained and patient verbalized understanding. Pt is alert and oriented. Pt is in the ICU. Pt states that she lives with her daughter and son in law and plans to return. Pt states that she is active with [facility] (4L baseline) and has a HHN. Pt has a hospital bed,as well. Pt does not have [facility] currently, but has had [facility] in the past (in 2018). Will follow.

## 2020-10-23 NOTE — PROGRESS NOTES
Internal Medicine ICU Progress Note      Events of Last 24 hours:     Doing better. On 7 L of oxygen. Breathing better. No chest pain. Used BiPAP last night. Cardiology saw the patient and diagnosed her with non-STEMI. Patient refused cath. Medical management only. Invasive Lines: PIV       MV:  n/a    No results for input(s): PHART, VOF2LBC, PO2ART in the last 72 hours. MV Settings:     / / /FiO2 : 40 %    IV:   dextrose         Vitals:  Temp  Av.5 °F (36.9 °C)  Min: 97.8 °F (36.6 °C)  Max: 99 °F (37.2 °C)  Pulse  Av.8  Min: 85  Max: 120  BP  Min: 78/46  Max: 138/70  SpO2  Av.3 %  Min: 84 %  Max: 97 %  FiO2   Av.8 %  Min: 35 %  Max: 40 %  No data found. CVP:        Intake/Output Summary (Last 24 hours) at 10/23/2020 1113  Last data filed at 10/23/2020 0600  Gross per 24 hour   Intake 802 ml   Output 1825 ml   Net -1023 ml       EXAM:  General: No distress. Alert. Eyes: PERRL. No sclera icterus. No conjunctiva injected. ENT: No discharge. Pharynx clear. Neck: Trachea midline. Normal thyroid. Resp: No accessory muscle use. + crackles. No wheezing. No rhonchi. No dullness on percussion. CV: Regular rate. Regular rhythm. No mumur or rub. + edema. No JVD. Palpable pedal pulses. GI: Non-tender. Non-distended. No masses. No organmegaly. Normal bowel sounds. No hernia. Skin: Warm and dry. No nodule on exposed extremities. No rash on exposed extremities. Lymph: No cervical LAD. No supraclavicular LAD. M/S: No cyanosis. No joint deformity. No clubbing. Neuro: Awake. Follows commands. Positive pupils/gag/corneals. Normal pain response. Psych: Oriented to person, place, time. No anxiety or agitation.      Medications:  Scheduled Meds:   atorvastatin  20 mg Oral Nightly    furosemide  40 mg Intravenous Daily    allopurinol  100 mg Oral Daily    insulin lispro  0-6 Units Subcutaneous TID     insulin lispro  0-3 Units Subcutaneous Nightly    oxybutynin  10 mg Oral Daily  rivaroxaban  15 mg Oral Daily    sodium chloride flush  10 mL Intravenous 2 times per day    aspirin  81 mg Oral Daily    mupirocin   Nasal BID    insulin glargine  20 Units Subcutaneous BID    ipratropium-albuterol  1 ampule Inhalation Q4H WA    metoprolol tartrate  12.5 mg Oral BID       PRN Meds:  glucose, dextrose, glucagon (rDNA), dextrose, sodium chloride flush, acetaminophen **OR** acetaminophen, polyethylene glycol, promethazine **OR** ondansetron, perflutren lipid microspheres, ipratropium-albuterol    Results:  CBC:   Recent Labs     10/21/20  2316 10/23/20  0414   WBC 12.5* 9.7   HGB 12.2 11.1*   HCT 38.5 34.7*   MCV 82.1 80.7    228     BMP:   Recent Labs     10/21/20  2316 10/23/20  0414    140   K 4.6 4.2   CL 98* 104   CO2 28 27   BUN 30* 37*   CREATININE 1.2 1.4*     LIVER PROFILE:   Recent Labs     10/21/20  2316   AST 34   ALT 18   BILITOT 0.4   ALKPHOS 125   Results for Amarilys Sanchez (MRN [id number]) as of 10/23/2020 11:18   Ref. Range 10/21/2020 23:16 10/22/2020 01:57 10/22/2020 08:00 10/23/2020 04:14   Pro-BNP Latest Ref Range: 0 - 449 pg/mL 8,863 (H)      Troponin Latest Ref Range: <0.01 ng/mL 0.64 (HH) 0.66 (HH) 0.80 (HH) 1.16 ()       Cultures:  none    Films:  XR CHEST PORTABLE   Final Result   CHF findings with cardiomegaly, vascular congestion and small bilateral   pleural effusions. Superimposed pneumonia cannot be excluded            ECHO 10/22/2020  Conclusions      Summary   LV systolic function is mildly reduced with an estimated EF of 45-50%. Endocardium not entirely well visualized but no obvious segmental wall   motion abnormalities. There is moderate concentric left ventricular hypertrophy. Elevated left ventricular diastolic filling pressure. Mild mitral annular calcification. The left atrium is at the upper limits of normal in size. Moderate right-sided chamber enlargement. Aortic valve appears sclerotic but opens adequately.    Mild mitral regurgitation. Mild-moderate tricuspid regurgitation. Systolic pulmonary artery pressure (SPAP) is estimated at [facility] c/w severe   pulmonary hypertension (RAP of 8 mmHg). Signature      ------------------------------------------------------------------   Electronically signed by Ruperto Montague MD, [facility]   (Interpreting physician) on 10/22/2020 at 05:16 PM      Assessment:    Principal Problem:    Acute respiratory failure with hypoxemia [facility])  Active Problems:    Essential hypertension    Acute on chronic diastolic CHF (congestive heart failure) ([facility])    ILD (interstitial lung disease) ([facility] [address]    Chronic obstructive pulmonary disease (HCC)    Anxiety    Chronic kidney disease (CKD) stage G3b/A1, moderately decreased glomerular filtration rate (GFR) between 30-44 mL/min/1.73 square meter and albuminuria creatinine ratio less than 30 mg/g    Elevated troponin    Acute on chronic respiratory failure with hypoxia ([facility])    NSTEMI (non-ST elevated myocardial infarction) ([facility] [address]    Acute congestive heart failure ([facility])    PA[facility]oxysmal [facility] fibrillation) [facility]  Resolved Problems:    * No resolved hospital problems. *          Plan:    #Acute on chronic hypoxic respiratory failure. Patient came to the emergency room with shortness of breath and respiratory distress. Her oxygen saturation was low. She is placed on 10 L of oxygen. Subsequently she is placed on BiPAP. The most likely cause of respiratory failure is exacerbation of her heart failure. Continue BiPAP for now. Pulmonary consultation. Admitted the ICU.  doing better. Used Bipap. On 7 L of oxygen. Doing better.     #Acute on chronic combined heart failure. Insistent with congestive heart failure. Chest x-ray shows pulmonary vascular congestion and clinical exam shows crackles. She has edema of her legs. She is on IV Lasix. Monitor her creatinine. Strict I's and O's.     #NSTEMI. Cardiology consultation has been obtained.   No interventions planned. Initial EKG showed accelerated junctional rhythm. Presently she seems to be normal sinus rhythm.     #Paroxysmal atrial fibrillation. She has a history of paroxysmal A. fib. She is on Xarelto at home.     #CKD. Monitor creatinine closely. She has stage IIIb CKD.     #COPD and interstitial lung disease. Use breathing treatments. COVID-19 is negative.     #Anxiety. Appears to be stable.     #Hypertension. She takes Diovan and Cardizem at home.     #Xarelto for DVT prophylaxis. All questions and concerns were addressed to the patient/family. Alternatives to my treatment were discussed. The note was completed using EMR. Every effort was made to ensure accuracy; however, inadvertent computerized transcription errors may be present.          Adi Rothman 11:13 AM 10/23/2020

## 2020-10-23 NOTE — FLOWSHEET NOTE
10/23/20 1606   Vital Signs   Temp 97.5 °F (36.4 °C)   Temp Source Oral   Pulse 98   Heart Rate Source Monitor   Resp 20   /79   BP Location Right upper arm   BP Upper/Lower Upper   Patient Position Semi fowlers   Level of Consciousness 0   MEWS Score 1   Patient Currently in Pain Denies   Oxygen Therapy   SpO2 97 %   O2 Device High flow nasal cannula   O2 Flow Rate (L/min) 7 L/min   Patient transferred from ICU. VSS. Orders have been released.

## 2020-10-24 NOTE — PROGRESS NOTES
10/23/20 2153   NIV Type   $NIV $Daily Charge   Skin Assessment Clean, dry, & intact   Skin Protection for O2 Device Yes   Equipment Type v60   Mode Bilevel   Mask Type Full face mask   Mask Size Medium   Settings/Measurements   IPAP 12 cmH20   CPAP/EPAP 6 cmH2O   Rate Ordered 14   Resp 27   FiO2  40 %   Vt Exhaled 417 mL   Minute Volume 13 Liters   Mask Leak (lpm) 5 lpm   Comfort Level Good   Using Accessory Muscles No   SpO2 94   Patient Observation   Observations spo2 94% on 40% bipap

## 2020-10-24 NOTE — PROGRESS NOTES
Internal Medicine  Progress Note      Events of Last 24 hours:     Doing better. On 7 L of oxygen. Breathing better. No chest pain. Used BiPAP last night. Cardiology saw the patient and diagnosed her with non-STEMI. Patient refused cath. Medical management only. 10/24- had some issues with oxygen desaturations but feeling better now. Used Bipap. On 4.5 L of oxygen at home. She is on 7.5 L of oxygen this am.    Invasive Lines: PIV       MV:  n/a    No results for input(s): PHART, DME4LYX, PO2ART in the last 72 hours. MV Settings:     / / /FiO2 : 40 %    IV:   dextrose         Vitals:  Temp  Av.7 °F (36.5 °C)  Min: 97.1 °F (36.2 °C)  Max: 98.9 °F (37.2 °C)  Pulse  Av  Min: 80  Max: 111  BP  Min: 105/46  Max: 157/73  SpO2  Av.8 %  Min: 86 %  Max: 100 %  FiO2   Av %  Min: 40 %  Max: 40 %  Patient Vitals for the past 4 hrs:   BP Temp Temp src Pulse Resp SpO2   10/24/20 0830 126/66 97.1 °F (36.2 °C) Oral 86 20 100 %   10/24/20 0726 -- -- -- -- -- 96 %       CVP:        Intake/Output Summary (Last 24 hours) at 10/24/2020 1001  Last data filed at 10/24/2020 0909  Gross per 24 hour   Intake 538 ml   Output 1250 ml   Net -712 ml       EXAM:  General: No distress. Alert. Eyes: PERRL. No sclera icterus. No conjunctiva injected. ENT: No discharge. Pharynx clear. Neck: Trachea midline. Normal thyroid. Resp: No accessory muscle use. + crackles. No wheezing. No rhonchi. No dullness on percussion. CV: Regular rate. Regular rhythm. No mumur or rub. + edema. No JVD. Palpable pedal pulses. GI: Non-tender. Non-distended. No masses. No organmegaly. Normal bowel sounds. No hernia. Skin: Warm and dry. No nodule on exposed extremities. No rash on exposed extremities. Lymph: No cervical LAD. No supraclavicular LAD. M/S: No cyanosis. No joint deformity. No clubbing. Neuro: Awake. Follows commands. Positive pupils/gag/corneals. Normal pain response. Psych: Oriented to person, place, time.  No anxiety or agitation. Medications:  Scheduled Meds:   atorvastatin  20 mg Oral Nightly    furosemide  40 mg Intravenous Daily    allopurinol  100 mg Oral Daily    insulin lispro  0-6 Units Subcutaneous TID     insulin lispro  0-3 Units Subcutaneous Nightly    oxybutynin  10 mg Oral Daily    rivaroxaban  15 mg Oral Daily    sodium chloride flush  10 mL Intravenous 2 times per day    aspirin  81 mg Oral Daily    mupirocin   Nasal BID    insulin glargine  20 Units Subcutaneous BID    ipratropium-albuterol  1 ampule Inhalation Q4H WA    metoprolol tartrate  12.5 mg Oral BID       PRN Meds:  glucose, dextrose, glucagon (rDNA), dextrose, sodium chloride flush, acetaminophen **OR** acetaminophen, polyethylene glycol, promethazine **OR** ondansetron, perflutren lipid microspheres, ipratropium-albuterol    Results:  CBC:   Recent Labs     10/21/20  2316 10/23/20  0414   WBC 12.5* 9.7   HGB 12.2 11.1*   HCT 38.5 34.7*   MCV 82.1 80.7    228     BMP:   Recent Labs     10/21/20  2316 10/23/20  0414    140   K 4.6 4.2   CL 98* 104   CO2 28 27   BUN 30* 37*   CREATININE 1.2 1.4*     LIVER PROFILE:   Recent Labs     10/21/20  2316   AST 34   ALT 18   BILITOT 0.4   ALKPHOS 125   Results for Hank Thurston (MRN [id number]) as of 10/23/2020 11:18   Ref. Range 10/21/2020 23:16 10/22/2020 01:57 10/22/2020 08:00 10/23/2020 04:14   Pro-BNP Latest Ref Range: 0 - 449 pg/mL 8,863 (H)      Troponin Latest Ref Range: <0.01 ng/mL 0.64 (HH) 0.66 (HH) 0.80 (HH) 1.16 (HH)       Cultures:  none    Films:  XR CHEST PORTABLE   Final Result   CHF findings with cardiomegaly, vascular congestion and small bilateral   pleural effusions. Superimposed pneumonia cannot be excluded            ECHO 10/22/2020  Conclusions      Summary   LV systolic function is mildly reduced with an estimated EF of 45-50%. Endocardium not entirely well visualized but no obvious segmental wall   motion abnormalities.    There is moderate function.      #Acute on chronic combined heart failure. Chest x-ray shows pulmonary vascular congestion and clinical exam shows crackles. She has edema of her legs. She is on IV Lasix. Monitor her creatinine. Strict I's and O's. Doing better.      #NSTEMI. Cardiology consultation has been obtained. No interventions planned. Initial EKG showed accelerated junctional rhythm. Presently she seems to be normal sinus rhythm.     #Paroxysmal atrial fibrillation. She has a history of paroxysmal A. fib. She is on Xarelto at home.     #CKD. Monitor creatinine closely. She has stage IIIb CKD.     #COPD and interstitial lung disease. Use breathing treatments. COVID-19 is negative.     #Anxiety. Appears to be stable.     #Hypertension. She takes Diovan and Cardizem at home.     #Xarelto for DVT prophylaxis. All questions and concerns were addressed to the patient/family. Alternatives to my treatment were discussed. The note was completed using EMR. Every effort was made to ensure accuracy; however, inadvertent computerized transcription errors may be present.          Megan Primrose 10:01 AM 10/24/2020

## 2020-10-24 NOTE — PROGRESS NOTES
10/24/20 0308   NIV Type   Equipment Type v60   Mode Bilevel   Mask Type Full face mask   Mask Size Medium   Settings/Measurements   IPAP 12 cmH20   CPAP/EPAP 6 cmH2O   Rate Ordered 14   Resp 30   FiO2  40 %   Vt Exhaled 542 mL   Minute Volume 13.2 Liters   Mask Leak (lpm) 0 lpm   Comfort Level Good   Using Accessory Muscles No   SpO2 96   Patient Observation   Observations spo2 96% on 40% bipap

## 2020-10-24 NOTE — PROGRESS NOTES
End of shift report given to [facility]. Pt in stable condition, care transferred at this time.  Electronically signed by Sneha Argueta RN on 10/24/2020 at 7:57 AM

## 2020-10-24 NOTE — PROGRESS NOTES
[facility]   Progress Note  Cardiology    CC:AF, CHF, ILD    HPI:Alert-up in bed on BiPap--Overall breathing better with diuresis. AF now NSR    Medications/Labs all Reviewed    Lab Results   Component Value Date    WBC 9.7 10/23/2020    HGB 11.1 (L) 10/23/2020    HCT 34.7 (L) 10/23/2020    MCV 80.7 10/23/2020     10/23/2020     Lab Results   Component Value Date    CREATININE 1.4 (H) 10/23/2020    BUN 37 (H) 10/23/2020     10/23/2020    K 4.2 10/23/2020     10/23/2020    CO2 27 10/23/2020     Lab Results   Component Value Date    INR 2.00 (H) 07/11/2018    PROTIME 22.8 (H) 07/11/2018        Physical Examination:    /66   Pulse 86   Temp 97.1 °F (36.2 °C) (Oral)   Resp 20   Ht 5' 2\" (1.575 m)   Wt 216 lb 9.6 oz (98.2 kg)   SpO2 100%   BMI 39.62 kg/m²    Constitutional and General Appearance:   . NAD   SKIN:  .     Warm and dry  EYES:    .     EOMI  Neck:   . Normal carotid contour  Respiratory:  · Normal excursion and expansion without use of accessory muscles  · Resp Auscultation: Normal breath sounds without dullness  Cardiovascular:  · The apical impulses not displaced  · Heart tones are crisp and normal  · Cervical veins are not engorged  · Normal S1S2, No S3, No Murmur  · Peripheral pulses are symmetrical and full  Extremities:  · There is no clubbing, cyanosis of the extremities.   · No edema  · Femoral Arteries: 2+ and equal  · Pedal Pulses: 2+ and equal   Abdomen:  · No masses or tenderness  · Liver/Spleen: No Abnormalities Noted  Neurological/Psychiatric:  · Alert and oriented in all spheres  · No abnormalities of mood, affect, memory    Assessment:    Principal Problem:      Essential hypertension--Controlled      Acute on chronic diastolic CHF (congestive heart failure) (HCC)-Improved post 1900cc diuresis      ILD (interstitial lung disease) (HCC)       Chronic obstructive pulmonary disease (HCC)-Asx on BiPap mask      Chronic kidney disease (CKD) stage G3b/A1, moderately decreased glomerular filtration rate (GFR) between 30-44 mL/min/1.73 square meter and albuminuria creatinine ratio less than 30 mg/g      Elevated troponin-0Non-diagnostic      Acute on chronic respiratory failure with hypoxia (HCC)      PAF (paroxysmal atrial fibrillation) (HCC)--Remains in NSR --94bpm.    Continue current diuresis and monitor rhythm.         Carter Stevens MD, 10/24/2020 10:46 AM

## 2020-10-24 NOTE — PROGRESS NOTES
Pulmonary Progress Note  CC: SOB, CHF    Subjective:  Still SOB, used Bipap overnight. EXAM: /66   Pulse 86   Temp 97.1 °F (36.2 °C) (Oral)   Resp 20   Ht 5' 2\" (1.575 m)   Wt 216 lb 9.6 oz (98.2 kg)   SpO2 98%   BMI 39.62 kg/m²  on 7lpm  Constitutional:  No acute distress. Eyes: PERRL. Conjunctivae anicteric. ENT: Normal nose. Normal tongue. Neck:  Trachea is midline. No thyroid tenderness. Respiratory: No accessory muscle usage. No wheezes. + rales. No Rhonchi. Cardiovascular: Normal S1S2. No digit clubbing. No digit cyanosis. No LE edema. Psychiatric: No anxiety or Agitation. Alert and Oriented to person, place and time. Scheduled Meds:   atorvastatin  20 mg Oral Nightly    furosemide  40 mg Intravenous Daily    allopurinol  100 mg Oral Daily    insulin lispro  0-6 Units Subcutaneous TID WC    insulin lispro  0-3 Units Subcutaneous Nightly    oxybutynin  10 mg Oral Daily    rivaroxaban  15 mg Oral Daily    sodium chloride flush  10 mL Intravenous 2 times per day    aspirin  81 mg Oral Daily    mupirocin   Nasal BID    insulin glargine  20 Units Subcutaneous BID    ipratropium-albuterol  1 ampule Inhalation Q4H WA    metoprolol tartrate  12.5 mg Oral BID     Continuous Infusions:   dextrose       PRN Meds:  glucose, dextrose, glucagon (rDNA), dextrose, sodium chloride flush, acetaminophen **OR** acetaminophen, polyethylene glycol, promethazine **OR** ondansetron, perflutren lipid microspheres, ipratropium-albuterol    Labs:  CBC:   Recent Labs     10/21/20  2316 10/23/20  0414   WBC 12.5* 9.7   HGB 12.2 11.1*   HCT 38.5 34.7*   MCV 82.1 80.7    228     BMP:   Recent Labs     10/21/20  2316 10/23/20  0414    140   K 4.6 4.2   CL 98* 104   CO2 28 27   BUN 30* 37*   CREATININE 1.2 1.4*       Cultures:    Films:  Chest imaging was reviewed by me and showed 10/21/20  CHF findings with cardiomegaly, vascular congestion and small bilateral    pleural effusions.  Superimposed pneumonia cannot be excluded       PCT 0.08  Rapid flu and covid negative     ASSESSMENT:  · Acute on chronic hypoxic respiratory failure  · NSTEMI: Patient and family agree that they do not want invasive intervention  · Acute on chronic systolic and diastolic heart failure  · COPD  · Afib  · CKD  · ILD  · KATIANA on Bipap     PLAN:  · Bipap QHS and PRN  · Supplemental oxygen to maintain SaO2 >92%; wean as tolerated   · Lasix per cardiology  · Duonebs  · Cardiology following  · Xarelto

## 2020-10-24 NOTE — PLAN OF CARE
Problem: OXYGENATION/RESPIRATORY FUNCTION  Goal: Patient will maintain patent airway  10/24/2020 0250 by Wiley Alvarez RN  Outcome: Ongoing  10/23/2020 1837 by Dottie De La Cruz RN  Outcome: Ongoing  Goal: Patient will achieve/maintain normal respiratory rate/effort  Description: Respiratory rate and effort will be within normal limits for the patient  10/24/2020 0250 by Wiley Alvarez RN  Outcome: Ongoing  10/23/2020 1837 by Dottie De La Cruz RN  Outcome: Ongoing     Problem: HEMODYNAMIC STATUS  Goal: Patient has stable vital signs and fluid balance  10/24/2020 0250 by Wiley Alvarez RN  Outcome: Ongoing  Patient's EF (Ejection Fraction) is greater than 40%    Patient's weights and intake/output reviewed:    Patient's Last Weight:   216 lb 9.6 oz obtained by standing scale. Last weight recorded was 210 lb but this was a stated weight. Pt is currently on 10 L O2. Pt denies shortness of breath. Pt with pitting lower extremity edema. Patient and/or Family's stated Goal of Care this Admission: reduce shortness of breath, increase activity tolerance, better understand heart failure and disease management, be more comfortable, and reduce lower extremity edema prior to discharge      Comorbidities Reviewed Yes  Patient has a past medical history of COPD (chronic obstructive pulmonary disease) ([address] Diabetes mellitus ([address] Gout, Hyperlipidemia, Hypertension, and Paroxysmal A-fib ([facility] [address]          >>For CHF and Comorbidity documentation on Education Time and Topics, please see Education Tab    10/23/2020 1837 by Dottie De La Cruz RN  Outcome: Ongoing     Problem: FLUID AND ELECTROLYTE IMBALANCE  Goal: Fluid and electrolyte balance are achieved/maintained  10/24/2020 0250 by Wiley Alvarez RN  Outcome: Ongoing  10/23/2020 1837 by Dottie De La Cruz RN  Outcome: Ongoing     Problem: ACTIVITY INTOLERANCE/IMPAIRED MOBILITY  Goal: Mobility/activity is maintained at optimum level for patient  10/24/2020 0250 by

## 2020-10-25 NOTE — PROGRESS NOTES
End of shift report given to [facility]. Pt in stable condition, care transferred at this time.  Electronically signed by Nuvia Esparza RN on 10/25/2020 at 8:38 AM

## 2020-10-25 NOTE — PROGRESS NOTES
Pulmonary Progress Note  CC: SOB, CHF    Subjective:  less SOB, used Bipap overnight. EXAM: /63   Pulse 88   Temp 97 °F (36.1 °C) (Oral)   Resp 20   Ht 5' 2\" (1.575 m)   Wt 214 lb (97.1 kg)   SpO2 92%   BMI 39.14 kg/m²  on 5lpm  Constitutional:  No acute distress. Eyes: PERRL. Conjunctivae anicteric. ENT: Normal nose. Normal tongue. Neck:  Trachea is midline. No thyroid tenderness. Respiratory: No accessory muscle usage. No wheezes. + rales. No Rhonchi. Cardiovascular: Normal S1S2. No digit clubbing. No digit cyanosis. No LE edema. Psychiatric: No anxiety or Agitation. Alert and Oriented to person, place and time. Scheduled Meds:   atorvastatin  20 mg Oral Nightly    furosemide  40 mg Intravenous Daily    allopurinol  100 mg Oral Daily    insulin lispro  0-6 Units Subcutaneous TID WC    insulin lispro  0-3 Units Subcutaneous Nightly    oxybutynin  10 mg Oral Daily    rivaroxaban  15 mg Oral Daily    sodium chloride flush  10 mL Intravenous 2 times per day    aspirin  81 mg Oral Daily    mupirocin   Nasal BID    insulin glargine  20 Units Subcutaneous BID    ipratropium-albuterol  1 ampule Inhalation Q4H WA    metoprolol tartrate  12.5 mg Oral BID     Continuous Infusions:   dextrose       PRN Meds:  benzonatate, glucose, dextrose, glucagon (rDNA), dextrose, sodium chloride flush, acetaminophen **OR** acetaminophen, polyethylene glycol, promethazine **OR** ondansetron, perflutren lipid microspheres, ipratropium-albuterol    Labs:  CBC:   Recent Labs     10/23/20  0414   WBC 9.7   HGB 11.1*   HCT 34.7*   MCV 80.7        BMP:   Recent Labs     10/23/20  0414 10/24/20  1223 10/25/20  0518    137 140   K 4.2 4.3 4.0    100 102   CO2 27 24 31   BUN 37* 39* 34*   CREATININE 1.4* 1.1 1.1       Cultures:    Films:  Chest imaging was reviewed by me and showed 10/21/20  CHF findings with cardiomegaly, vascular congestion and small bilateral    pleural effusions.  Superimposed pneumonia cannot be excluded       PCT 0.08  Rapid flu and covid negative     ASSESSMENT:  · Acute on chronic hypoxic respiratory failure  · NSTEMI: Patient and family agree that they do not want invasive intervention  · Acute on chronic systolic and diastolic heart failure  · COPD  · Afib  · CKD  · ILD  · KATIANA on Bipap     PLAN:  · Bipap QHS and PRN  · Supplemental oxygen to maintain SaO2 >92%; wean as tolerated   · Lasix per cardiology  · Duonebs  · Cardiology following  · Xarelto

## 2020-10-25 NOTE — PROGRESS NOTES
10/24/20 9746   NIV Type   Equipment Type v60   Mode Biphasic   Mask Type Full face mask   Mask Size Medium   Settings/Measurements   IPAP 12 cmH20   CPAP/EPAP 6 cmH2O   Rate Ordered 12   Resp 26   FiO2  40 %   Vt Exhaled 551 mL   Minute Volume 12.9 Liters   Mask Leak (lpm) 0 lpm   Comfort Level Good   Using Accessory Muscles No   SpO2 98   Breath Sounds   Right Upper Lobe Diminished   Right Middle Lobe Diminished   Right Lower Lobe Diminished   Left Upper Lobe Diminished   Left Lower Lobe Diminished   Alarm Settings   Alarms On Y   Press Low Alarm 8 cmH2O   High Pressure Alarm 40 cmH2O   Delay Alarm 20 sec(s)   Resp Rate Low Alarm 12   High Respiratory Rate 40 br/min

## 2020-10-25 NOTE — DISCHARGE INSTR - COC
Continuity of Care Form    Patient Name: Anusha Gaspar   :  1930  MRN:  [id number]    Admit date:  10/21/2020  Discharge date:  10/25/20      Code Status Order: DNR-CCA   Advance Directives:   [facility] Documentation     Date/Time Healthcare Directive Type of Healthcare Directive Copy in [facility] [address] Agent's Name Healthcare Agent's Phone Number    10/22/20 9612  No, patient does not have an advance directive for healthcare treatment -- -- -- -- --          Admitting Physician:  Mg Pulido MD  PCP: MD Izabel    Discharging Nurse:  [facility] Unit/Room#: [facility] Phone Number: [phone]    Emergency Contact:   Extended Emergency Contact Information  Primary Emergency Contact: [address] Phone: [phone]  Work Phone: [phone]  Mobile Phone: [phone]  Relation: Child    Past Surgical History:  Past Surgical History:   Procedure Laterality Date    HYSTERECTOMY         Immunization History:   Immunization History   Administered Date(s) Administered    Influenza, High Dose (Fluzone 65 yrs and older) 10/01/2014, 10/09/2015, 10/01/2017, 2018, 10/03/2019    Pneumococcal Conjugate 13-valent (Mardel Cresencio) 10/09/2015, 2018    Pneumococcal Polysaccharide ([id number]) 2020       Active Problems:  Patient Active Problem List   Diagnosis Code    Multifocal atrial tachycardia (HCC) 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    Hyperkalemia E87.5    Essential hypertension I10    Acute on chronic diastolic CHF (congestive heart failure) (HCC) I50.33    Persistent atrial fibrillation (HCC) I48.19    Dyspnea and respiratory abnormalities R06.00, R06.89    Acute respiratory failure with hypoxemia ([address] J96.01    ILD (interstitial lung disease) ([address] J84.9    Pulmonary HTN ([address] I27.20    KATIANA on CPAP G47.33, Z99.89    Hyperglycemia R73.9    Hypoxia R09.02    Acute pulmonary edema ([facility] J81.0    Altered mental status R41.82    DM (diabetes mellitus) ([facility] E11.9    Chronic obstructive pulmonary disease ([facility]) J44.9    Anxiety F41.9    Abnormal finding on thyroid function test R94.6    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 H35.3134    Heart disease I51.9    History of pneumonia Z87.01    Posterior vitreous detachment of both eyes H43.813    Posterior vitreous detachment H43.819    Renal impairment N28.9    Type 2 diabetes mellitus without complication ([facility]) X55.5    Chronic kidney disease (CKD) stage G3b/A1, moderately decreased glomerular filtration rate (GFR) between 30-44 mL/min/1.73 square meter and albuminuria creatinine ratio less than 30 mg/g N18.32    Elevated troponin R77.8    Acute on chronic respiratory failure with hypoxia ([facility]) J96.21    NSTEMI (non-ST elevated myocardial infarction) ([facility]) I21.4    Acute congestive heart failure ([facility]) I50.9    PAF (paroxysmal atrial fibrillation) ([facility]) I48.0       Isolation/Infection:   Isolation          No Isolation        Patient Infection Status     Infection Onset Added Last Indicated Last Indicated By Review Planned Expiration Resolved Resolved By    None active    Resolved    COVID-19 Rule Out 10/21/20 10/21/20 10/22/20 COVID-19 (Ordered)   10/22/20 Rule-Out Test Resulted          Nurse Assessment:  Last Vital Signs: /63   Pulse 88   Temp 97 °F (36.1 °C) (Oral)   Resp 20   Ht 5' 2\" (1.575 m)   Wt 214 lb (97.1 kg)   SpO2 92%   BMI 39.14 kg/m²     Last documented pain score (0-10 scale): Pain Level: 0  Last Weight:   Wt Readings from Last 1 Encounters:   10/25/20 214 lb (97.1 kg)     Mental Status:  oriented and alert    IV Access:  - None    Nursing Mobility/ADLs:  Walking   Assisted  Transfer  Assisted  Bathing  Assisted  Dressing  Assisted  Toileting  Assisted  Feeding  Assisted  Med Admin  Assisted  Med Delivery   whole    Wound Care Documentation and Therapy:        Elimination:  Continence:   · Bowel: No  · Bladder: No  Urinary Catheter: None   Colostomy/Ileostomy/Ileal Conduit: No       Date of Last BM: 10/24/20    Intake/Output Summary (Last 24 hours) at 10/25/2020 1145  Last data filed at 10/25/2020 1019  Gross per 24 hour   Intake 720 ml   Output 800 ml   Net -80 ml     I/O last 3 completed shifts: In: 144 [P.O.:778]  Out: 1125 [Urine:1125]    Safety Concerns: At Risk for Falls    Impairments/Disabilities:      None    Nutrition Therapy:  Current Nutrition Therapy:   - Oral Diet:  Carb Control 4 carbs/meal (1800kcals/day) and Cardiac    Routes of Feeding: Oral  Liquids:     Daily Fluid Restriction: no  Last Modified Barium Swallow with Video (Video Swallowing Test): not done    Treatments at the Time of Hospital Discharge:   Respiratory Treatments:     Oxygen Therapy:  is on oxygen at 4 L/min per nasal cannula.   Ventilator:    - BiPAP   IPAP: 12 cmH20, CPAP/EPAP: 6 cmH2O only when sleeping    Rehab Therapies: Physical Therapy and Occupational Therapy  Weight Bearing Status/Restrictions: No weight bearing restirctions  Other Medical Equipment (for information only, NOT a DME order):  walker  Other Treatments:       Patient's personal belongings (please select all that are sent with patient):  None    RN SIGNATURE:  Electronically signed by Gypsy Napier RN on 10/25/20 at 11:48 AM EDT    CASE MANAGEMENT/SOCIAL WORK SECTION    Inpatient Status Date: 10/21/2020    Readmission Risk Assessment Score:  Readmission Risk              Risk of Unplanned Readmission:        17           Discharging to Facility/ Agency   · Name: [facility]  · Address:  · Phone: [phone]  · Fax:    Dialysis Facility (if applicable)   · Name:  · Address:  · Dialysis Schedule:  · Phone:  · Fax:    / signature: Electronically signed by Jerry Bettencourt RN on 10/25/20 at 12:15 PM EDT    PHYSICIAN SECTION    Prognosis: Fair    Condition at Discharge: Stable    Rehab Potential (if transferring to Rehab): Fair    Recommended Labs or Other Treatments After Discharge: n/a    Physician Certification: I certify the above information and transfer of Humble Aranda  is necessary for the continuing treatment of the diagnosis listed and that she requires [address] for less 30 days.      Update Admission H&P: No change in H&P    PHYSICIAN SIGNATURE:  Electronically signed by YOUSIF Broderick RN on 10/25/20 at 12:15 PM EDT

## 2020-10-25 NOTE — PLAN OF CARE
Patient's EF (Ejection Fraction) is greater than 40%    Patient's weights and intake/output reviewed:    Patient's Last Weight: 214   lbs obtained by standing scale. Difference of 2 lbs  than last documented weight. Intake/Output Summary (Last 24 hours) at 10/25/2020 1053  Last data filed at 10/25/2020 1019  Gross per 24 hour   Intake 720 ml   Output 800 ml   Net -80 ml         Pt is currently on 5 L O2. Pt with complaints of shortness of breath. Pt without lower extremity edema. Patient and/or Family's stated Goal of Care this Admission: reduce shortness of breath, increase activity tolerance, better understand heart failure and disease management, be more comfortable, reduce lower extremity edema, and unable to assess, will attempt again prior to discharge      Comorbidities Reviewed     Patient has a past medical history of COPD (chronic obstructive pulmonary disease) ([address] Diabetes mellitus ([address] Gout, Hyperlipidemia, Hypertension, and Paroxysmal A-fib ([facility]          >>For CHF and Comorbidity documentation on Education Time and Topics, please see Education Tab

## 2020-10-25 NOTE — PROGRESS NOTES
Patient educated on discharge instructions as well as new medications use, dosage, administration and possible side effects. Patient verified knowledge. IV removed without difficulty and dry dressing in place. Telemetry monitor removed and [facility]. Pt left facility in stable condition to home with all of their personal belongings.  Дмитрий Araiza RN

## 2020-10-25 NOTE — PROGRESS NOTES
10/24/20 2116   NIV Type   $NIV $Daily Charge   Skin Assessment Clean, dry, & intact   Skin Protection for O2 Device Yes   Equipment Type v60   Mode Bilevel   Mask Type Full face mask   Mask Size Medium   Settings/Measurements   IPAP 12 cmH20   CPAP/EPAP 6 cmH2O   Rate Ordered 14   Resp 30   FiO2  40 %   Vt Exhaled 527 mL   Minute Volume 17.4 Liters   Mask Leak (lpm) 1 lpm   Comfort Level Good   Using Accessory Muscles No   SpO2 97   Patient Observation   Observations spo2 97% on 40% bipap

## 2020-10-25 NOTE — PLAN OF CARE
Problem: Falls - Risk of:  Goal: Will remain free from falls  Description: Will remain free from falls  Outcome: Ongoing  Goal: Absence of physical injury  Description: Absence of physical injury  Outcome: Ongoing       Problem: OXYGENATION/RESPIRATORY FUNCTION  Goal: Patient will maintain patent airway  10/25/2020 0032 by Tereza Mcmillan RN  Outcome: Ongoing  10/24/2020 1950 by Qian Jones RN  Outcome: Ongoing  Goal: Patient will achieve/maintain normal respiratory rate/effort  Description: Respiratory rate and effort will be within normal limits for the patient  Outcome: Ongoing     Problem: HEMODYNAMIC STATUS  Goal: Patient has stable vital signs and fluid balance  Outcome: Ongoing  Patient's EF (Ejection Fraction) is greater than 40%    Patient's weights and intake/output reviewed:    Patient's Last Weight: 214 lb obtained by standing scale. Difference of 2 lbs less than last documented weight. Intake/Output Summary (Last 24 hours) at 10/25/2020 0033  Last data filed at 10/24/2020 2229  Gross per 24 hour   Intake 598 ml   Output 1575 ml   Net -977 ml         Pt is currently on 5 L O2. Pt denies shortness of breath. Pt with pitting lower extremity edema. Patient and/or Family's stated Goal of Care this Admission: reduce shortness of breath, increase activity tolerance, better understand heart failure and disease management, be more comfortable, and reduce lower extremity edema prior to discharge      Comorbidities Reviewed Yes  Patient has a past medical history of COPD (chronic obstructive pulmonary disease) ([facility] [address] Diabetes mellitus ([facility] [address] Gout, Hyperlipidemia, Hypertension, and Paroxysmal A-fib ([facility] [address]          >>For CHF and Comorbidity documentation on Education Time and Topics, please see Education Tab       Problem: FLUID AND ELECTROLYTE IMBALANCE  Goal: Fluid and electrolyte balance are achieved/maintained  Outcome: Ongoing     Problem: ACTIVITY INTOLERANCE/IMPAIRED MOBILITY  Goal: Mobility/activity is maintained at optimum level for patient  Outcome: Ongoing

## 2020-10-25 NOTE — CARE COORDINATION
DISCHARGE ORDER  Date/Time 10/25/2020 12:36 PM  Completed by: Alexis Cobb, Case Management    Patient Name: Verónica Mcallister      : 1930  Admitting Diagnosis: Heart failure exacerbated by sotalol ([address] [I50.9]      Admit order Date and Status: INPT 10/21/2020  (verify MD's last order for status of admission)      Noted discharge order. If applicable PT/OT recommendation at Discharge: n/a  DME recommendation by PT/OT:n/a  Confirmed discharge plan : Yes  with whom_with pt and dtrmiracle______________  If pt confirmed DC plan does family need to be contacted by CM Yes if yes who_dtrLori present._____  Discharge Plan: pt cont to plan to return home at discharge and would like for [facility] to follow for SN. Orders called and faxed to Larayne Meckel with [facility]. Has home O2 with Medical Services. Pt denies further needs. Reviewed chart. Role of discharge planner explained and patient verbalized understanding. Discharge order is noted. Has Home O2 in place on admit:  Yes  Informed of need to bring portable home O2 tank on day of discharge for nursing to connect prior to leaving:   Yes  Verbalized agreement/Understanding:   Yes  Pt is being d/c'd to home today. Pt's O2 sats are 97% on 4.5 lpm.    Discharge timeout done with [facility]. All discharge needs and concerns addressed.

## 2020-10-25 NOTE — PROGRESS NOTES
Shift assessment complete, morning medications given, patient resting with no complaints of pain, O2 down to 5 L, will continue to monitor.  Maeve Parada RN

## 2020-10-25 NOTE — PROGRESS NOTES
Shift assessment complete. Call light and bedside table within reach.  Electronically signed by Sherwin De Leon RN on 10/24/2020 at 8:31 PM

## 2020-10-25 NOTE — PROGRESS NOTES
non-productive = 0    Vital Signs   /63   Pulse 88   Temp 97 °F (36.1 °C) (Oral)   Resp 20   Ht 5' 2\" (1.575 m)   Wt 214 lb (97.1 kg)   SpO2 92%   BMI 39.14 kg/m²   SPO2 (COPD values may differ): 86-87% on room air or greater than 92% on FiO2 35- 50% = 3    Peak Flow (asthma only): not applicable = 0    RSI: 7-8 = BID and Q4HPRN (every four hours as needed) for dyspnea        Plan       Goals: medication delivery, mobilize retained secretions, volume expansion and improve oxygenation    Patient/caregiver was educated on the proper method of use for Respiratory Care Devices:  Yes      Level of patient/caregiver understanding able to:   ? Verbalize understanding   ? Demonstrate understanding       ? Teach back        ? Needs reinforcement       ? No available caregiver               ? Other:     Response to education:  Very Good     Is patient being placed on Home Treatment Regimen? Yes     Does the patient have everything they need prior to discharge? Yes     Comments: patient assessed. Chart reviewed. Plan of Care: change to tid    Electronically signed by Yulia Lawrence RCP on 10/25/2020 at 11:23 AM    Respiratory Protocol Guidelines     1. Assessment and treatment by Respiratory Therapy will be initiated for medication and therapeutic interventions upon initiation of aerosolized medication. 2. Physician will be contacted for respiratory rate (RR) greater than 35 breaths per minute. Therapy will be held for heart rate (HR) greater than 140 beats per minute, pending direction from physician. 3. Bronchodilators will be administered via Metered Dose Inhaler (MDI) with spacer when the following criteria are met:  a. Alert and cooperative     b. HR < 140 bpm  c. RR < 30 bpm                d. Can demonstrate a 2-3 second inspiratory hold  4. Bronchodilators will be administered via Hand Held Nebulizer MARGOTH [facility]) to patients when ANY of the following criteria are met  a.  Incognizant or uncooperative b. Patients treated with HHN at Home        c. Unable to demonstrate proper use of MDI with spacer     d. RR > 30 bpm   5. Bronchodilators will be delivered via Metered Dose Inhaler (MDI), HHN, Aerogen to intubated patients on mechanical ventilation. 6. Inhalation medication orders will be delivered and/or substituted as outlined below. Aerosolized Medications Ordering and Administration Guidelines:    1. All Medications will be ordered by a physician, and their frequency and/or modality will be adjusted as defined by the patients Respiratory Severity Index (RSI) score. 2. If the patient does not have documented COPD, consider discontinuing anticholinergics when RSI is less than 9.  3. If the bronchospasm worsens (increased RSI), then the bronchodilator frequency can be increased to a maximum of every 4 hours. If greater than every 4 hours is required, the physician will be contacted. 4. If the bronchospasm improves, the frequency of the bronchodilator can be decreased, based on the patient's RSI, but not less than home treatment regimen frequency. 5. Bronchodilator(s) will be discontinued if patient has a RSI less than 9 and has received no scheduled or as needed treatment for 72  Hrs. Patients Ordered on a Mucolytic Agent:    1. Must always be administered with a bronchodilator. 2. Discontinue if patient experiences worsened bronchospasm, or secretions have lessened to the point that the patient is able to clear them with a cough. Anti-inflammatory and Combination Medications:    1. If the patient lacks prior history of lung disease, is not using inhaled anti-inflammatory medication at home, and lacks wheezing by examination or by history for at least 24 hours, contact physician for possible discontinuation.

## 2020-10-26 NOTE — CARE COORDINATION
[facility] and COVID-19 Initial Outreach    Call within 2 business days of discharge: Yes    Patient: Xavier Costa Patient : 1930   MRN: [id number]  Discharge Date: 10/25/20   RARS: Readmission Risk Score: 18      Last Discharge [employer]       Complaint Diagnosis Description Type Department Provider    10/21/20 Shortness of Breath Acute on chronic respiratory failure with hypoxia ([facility] [address] . .. ED to Hosp-Admission (Discharged) (Everardo Palencia) Isaias Gonzalez MD; Domo Gamble. .. Spoke with: Darius Acosta (dtr/POA) - ok per HIPAA    Non-face-to-face services provided:  Obtained and reviewed discharge summary and/or continuity of care documents  Communication with home health agencies or other community services the patient is currently [facility]  Education of patient/family/caregiver/guardian to support self-management-CHF education  Assessment and support for treatment adherence and medication management-1111F completed    Challenges to be reviewed by the provider   Additional needs identified to be addressed with provider No    COVID-19 Not Detected on 10/22/2020. Patient informed of results, if available? Yes       Method of communication with provider : none    Advance Care Planning:   Does patient have an Advance Directive:  decision maker updated. Was this a readmission? No  Patient stated reason for admission: SOB  Patients top risk factors for readmission: medical condition    Care Transition Nurse (CTN) contacted the family by telephone to perform post hospital discharge assessment. Provided introduction to self, and explanation of the CTN role. CTN reviewed discharge instructions, medical action plan and red flags with family who verbalized understanding. Family given an opportunity to ask questions and does not have any further questions or concerns at this time. Were discharge instructions available to patient? Yes.  Reviewed appropriate site of care based on symptoms and resources available to patient including: PCP, Specialist, Home health, [employer] and CTN. The family agrees to contact the PCP office for questions related to their healthcare. Medication reconciliation was performed with family, who verbalizes understanding of administration of home medications. Covid Risk Education    Patient has following COVID-19 risk factors: heart failure, COPD, acute respiratory failure, diabetes and chronic kidney disease. Education provided regarding infection prevention, and signs and symptoms of COVID-19 and when to seek medical attention with family who verbalized understanding. The family agrees to contact the COVID-19 hotline [phone] or PCP office for questions related to COVID-19. Symptoms reviewed with family who verbalized the following symptoms: no new symptoms and no worsening symptoms. Due to no new or worsening symptoms encounter was not routed to provider for escalation. Discussed follow-up appointments. If no appointment was previously scheduled, appointment scheduling offered: Yes. Is follow up appointment scheduled within 7 days of discharge? Yes  Non-[facility] follow up appointment(s): NA    Plan for follow-up call in 5-7 days based on severity of symptoms and risk factors. Plan for next call: symptom management-CHF    Got on scale this morning but had some issues - 211# final weight. Oxygen at 4 lpm. Deconditioned from hospital stay and illness but overall improved per daughter. CHF education:  -weigh daily in the morning at the same time and in the same clothing  -report weight gain of >3#/day or >5#/week  -report increased SOB, edema, abd fullness, difficulty lying flat  -report palpitations, cough, difficulty urinating  -2L/day fluid restriction, low sodium diet    Has not heard from [facility] yet. Has had in past. Aware they are resource for 24/7 concerns. Denies needs/questions at this time.  CTN provided contact information for future needs. Outreach to [facility], spoke with Geo Barber in intake. He could not find orders for this patient but will use his carelink access to pull the order and enter for new SOC.      Follow Up  Future Appointments   Date Time Provider Natalio Bueno   10/29/2020  9:40 AM PAM Castro CNP AND [facility]   2/10/2021  1:00 PM MD JOHANNA Salas AND LILY Bourgeois, RN  Care Transition Nurse  [phone] mobile

## 2020-10-27 NOTE — TELEPHONE ENCOUNTER
Daughter calling stating pt HR is in the high 130's. Per daughter was advised to call office by [facility]. Daughter states you seen her in the hosp (10.23.20) prescribed Metoprolol 12.5 mg one bid. She is asking if this needs to be increased? Per daughter Heart rhythm is no jumping all over the place. Just a really high HR. I did see that pcp did advise pt to go to ER. But, declined. Please advise.      Current Outpatient Medications   Medication Sig Dispense Refill    aspirin 81 MG chewable tablet Take 1 tablet by mouth daily 30 tablet 3    metoprolol tartrate (LOPRESSOR) 25 MG tablet Take 0.5 tablets by mouth 2 times daily 60 tablet 3    benzonatate (TESSALON) 100 MG capsule Take 1 capsule by mouth 3 times daily as needed for Cough 30 capsule 0    atorvastatin (LIPITOR) 20 MG tablet Take 1 tablet by mouth nightly 30 tablet 3    furosemide (LASIX) 40 MG tablet Take 60 mg by mouth daily 1.5 tablets daily      oxybutynin (DITROPAN-XL) 10 MG extended release tablet TAKE 1 TABLET BY MOUTH DAILY 30 tablet 5    clorazepate (TRANXENE) 7.5 MG tablet TAKE 1 TABLET BY MOUTH EVERY NIGHT 90 tablet 0    blood glucose test strips (ONETOUCH ULTRA) strip 1 each by Does not apply route daily DMll, E11.9 100 strip 3    allopurinol (ZYLOPRIM) 100 MG tablet TAKE 1 TABLET BY MOUTH EVERY DAY 90 tablet 3    levalbuterol (XOPENEX) 1.25 MG/3ML nebulizer solution USE 1 NEBULE QID PRN 3 mL 5    rivaroxaban (XARELTO) 15 MG TABS tablet TAKE 1 TABLET BY MOUTH EVERY MORNING WITH BREAKFAST 30 tablet 5    LANTUS SOLOSTAR 100 UNIT/ML injection pen ADMINISTER 24 UNITS UNDER THE SKIN TWICE DAILY (Patient taking differently: 20 Units ADMINISTER 24 UNITS UNDER THE SKIN TWICE DAILY) 15 mL 5    Lancet Devices (ONE TOUCH DELICA LANCING DEV) MISC 1 each by Does not apply route daily 1 each 1    Blood Glucose Monitoring Suppl (ONE TOUCH ULTRA MINI) w/Device KIT 1 kit by Does not apply route daily 1 kit 0    levalbuterol (Zana Oz HFA) 45 MCG/ACT inhaler INHALE 2 PUFFS INTO THE LUNGS EVERY 4 HOURS AS NEEDED FOR WHEEZING 75 g 3    Handicap Placard MISC by Does not apply route Unable to walk distance, expiration 5 years 1 each 0    CALCIUM PO Take 1,200 mg by mouth       CPAP Machine MISC by Does not apply route nightly       No current facility-administered medications for this visit.

## 2020-10-27 NOTE — TELEPHONE ENCOUNTER
Please increase metoprolol to 25mg po BID. I remember her from hospital. She does not want any invasive testing and wants to be DNR/DNI. Will try to treat medically as best possible. See how she does with increased dose and call if not helping.

## 2020-10-27 NOTE — TELEPHONE ENCOUNTER
[phone] [facility] nurse called they went out to see the pt today. They will be sending papers to be signed. They are working on home care / [employer] . Concern is that her current heart rate stay around 138. Cardizem was D/C while in hospital.   Replaced with metoprolol 12.5mg BID. They wonder if this should be increased.

## 2020-10-27 NOTE — TELEPHONE ENCOUNTER
Spoke with [place] patients daughter per HIPPA. She said she doesn't think Mindi Jain is going to go to the ER since she just got out but she will tell her. She is going to contact Dr. Anna Brice since he is the one that put her on the medication and see if she can get an appt with him.

## 2020-10-28 NOTE — DISCHARGE SUMMARY
home on ASA, statin and BB      Paroxysmal atrial fibrillation  - AC on Xarelto at home  - initially with accelerated junctional rhythm, now NSR   - Continue home medications      CKD Stage IIIb  - Monitor creatinine closely while using IV diuretics   - stable      COPD and interstitial lung disease  - Use breathing treatments as needed  - COVID-19 is negative.     Anxiety  - Appears to be stable.     Hypertension  - Continue Diovan and Cardizem at home.       Procedures (Please Review Full Report for Details)  BiPAP    Consults    Pulmonology  Cardiology    Physical Exam at Discharge:    /63   Pulse 88   Temp 97 °F (36.1 °C) (Oral)   Resp 20   Ht 5' 2\" (1.575 m)   Wt 214 lb (97.1 kg)   SpO2 92%   BMI 39.14 kg/m²     General: No distress. Alert. Eyes: PERRL. No sclera icterus. No conjunctiva injected. ENT: No discharge. Pharynx clear. Neck: Trachea midline. Normal thyroid. Resp: No accessory muscle use. Minimal crackles. No wheezing. No rhonchi. No dullness on percussion. CV: Regular rate. Regular rhythm. No mumur or rub. mild edema. No JVD. Palpable pedal pulses. GI: Non-tender. Non-distended. No masses. No organmegaly. Normal bowel sounds. No hernia. Skin: Warm and dry. No nodule on exposed extremities. No rash on exposed extremities. Lymph: No cervical LAD. No supraclavicular LAD. M/S: No cyanosis. No joint deformity. No clubbing. Neuro: Awake. Follows commands. Positive pupils/gag/corneals. Normal pain response. Psych: Oriented to person, place, time. No anxiety or agitation.      CBC:        Recent Labs     10/21/20  2316 10/23/20  0414   WBC 12.5* 9.7   HGB 12.2 11.1*   HCT 38.5 34.7*   MCV 82.1 80.7    228      BMP:        Recent Labs     10/21/20  2316 10/23/20  0414    140   K 4.6 4.2   CL 98* 104   CO2 28 27   BUN 30* 37*   CREATININE 1.2 1.4*      LIVER PROFILE:       Recent Labs     10/21/20  2316   AST 34   ALT 18   BILITOT 0.4   ALKPHOS 125   Results for Alesha Dubose (MRN [id number]) as of 10/23/2020 11:18    Ref. Range 10/21/2020 23:16 10/22/2020 01:57 10/22/2020 08:00 10/23/2020 04:14   Pro-BNP Latest Ref Range: 0 - 449 pg/mL 8,863 (H)         Troponin Latest Ref Range: <0.01 ng/mL 0.64 (HH) 0.66 (HH) 0.80 (HH) 1.16 (HH)       CULTURES  Blood Cx: NGTD  Rapid Flu: negative   COVID-19: NOT detected     RADIOLOGY  XR CHEST PORTABLE   Final Result   CHF findings with cardiomegaly, vascular congestion and small bilateral   pleural effusions. Superimposed pneumonia cannot be excluded           ECHO 10/22/2020  Conclusions      Summary   LV systolic function is mildly reduced with an estimated EF of 45-50%.   Endocardium not entirely well visualized but no obvious segmental wall   motion abnormalities.   There is moderate concentric left ventricular hypertrophy.   Elevated left ventricular diastolic filling pressure.   Mild mitral annular calcification.   The left atrium is at the upper limits of normal in size.   Moderate right-sided chamber enlargement.   Aortic valve appears sclerotic but opens adequately.   Mild mitral regurgitation.   Mild-moderate tricuspid regurgitation.   Systolic pulmonary artery pressure (SPAP) is estimated at 66mmHg c/w severe   pulmonary hypertension (RAP of 8 mmHg). Discharge Medications     Medication List      START taking these medications    aspirin 81 MG chewable tablet  Take 1 tablet by mouth daily     atorvastatin 20 MG tablet  Commonly known as:  LIPITOR  Take 1 tablet by mouth nightly     benzonatate 100 MG capsule  Commonly known as:  TESSALON  Take 1 capsule by mouth 3 times daily as needed for Cough     metoprolol tartrate 25 MG tablet  Commonly known as:  LOPRESSOR  Take 0.5 tablets by mouth 2 times daily        CHANGE how you take these medications    furosemide 40 MG tablet  Commonly known as:  LASIX  What changed:  Another medication with the same name was removed.  Continue taking this medication, and follow the directions you see here. Lantus SoloStar 100 UNIT/ML injection pen  Generic drug:  insulin glargine  ADMINISTER 24 UNITS UNDER THE SKIN TWICE DAILY  What changed:  See the new instructions. CONTINUE taking these medications    allopurinol 100 MG tablet  Commonly known as:  ZYLOPRIM  TAKE 1 TABLET BY MOUTH EVERY DAY     CALCIUM PO     clorazepate 7.5 MG tablet  Commonly known as:  TRANXENE  TAKE 1 TABLET BY MOUTH EVERY NIGHT     CPAP Machine Misc     Handicap Placard Misc  by Does not apply route Unable to walk distance, expiration 5 years     levalbuterol 1.25 MG/3ML nebulizer solution  Commonly known as:  XOPENEX  USE 1 NEBULE QID PRN     levalbuterol 45 MCG/ACT inhaler  Commonly known as:  XOPENEX HFA  INHALE 2 PUFFS INTO THE LUNGS EVERY 4 HOURS AS NEEDED FOR WHEEZING     ONE TOUCH DELICA LANCING DEV Misc  1 each by Does not apply route daily     ONE TOUCH ULTRA MINI w/Device Kit  1 kit by Does not apply route daily     OneTouch Ultra strip  Generic drug:  blood glucose test strips  1 each by Does not apply route daily DMll, E11.9     oxybutynin 10 MG extended release tablet  Commonly known as:  DITROPAN-XL  TAKE 1 TABLET BY MOUTH DAILY     rivaroxaban 15 MG Tabs tablet  Commonly known as:  Xarelto  TAKE 1 TABLET BY MOUTH EVERY MORNING WITH BREAKFAST        STOP taking these medications    dilTIAZem 240 MG extended release capsule  Commonly known as:  CARDIZEM CD     valsartan 40 MG tablet  Commonly known as:  DIOVAN           Where to Get Your Medications      These medications were sent to [address] [phone] - F [phone]  [place] , [place] [address]    Phone:  [phone]   · aspirin 81 MG chewable tablet  · atorvastatin 20 MG tablet  · benzonatate 100 MG capsule  · metoprolol tartrate 25 MG tablet       Discharged in stable condition to home    Follow Up:   Follow up with PCP, cardiology OP    More than 30 minutes indiana Luna 10/29/2020 9:23 AM

## 2020-10-30 NOTE — TELEPHONE ENCOUNTER
Pt. Needs O2 concentrator to go higher than 4 when she moves around. Was in hospital and her O2 sats keep dropping when she excerts herself. Wants us to send order to [facility]. Dr. Zhang Living wondering if you can order this as Dr. Fidencio Vega is in ICU and has not been able to respond.

## 2020-10-30 NOTE — TELEPHONE ENCOUNTER
Pt. Daughter refused for the walk test. She was very upset. Will wait and talk to Dr. Jacqueline Fernandes on Tuesday.

## 2020-10-30 NOTE — TELEPHONE ENCOUNTER
Can we please request her [employer] company to set up an oxygen concentrator which goes upto 10 lts/min

## 2020-10-30 NOTE — TELEPHONE ENCOUNTER
Not seen by Dr. Umair Higginbotham since 2019, was recently hospitalized at [facility]. Recommend she obtain a 6-minute walk test to determine the flow rate. Order placed.

## 2020-11-02 NOTE — TELEPHONE ENCOUNTER
Patient's daughter called stating metoprolol is making her mother sick.  Patient is weak, dizzy and lethargic

## 2020-11-03 NOTE — CARE COORDINATION
[facility] Transitions Follow Up Call    11/3/2020    Patient: Armaan Pitts  Patient : 1930   MRN: [id number]  Reason for Admission: respiratory failure, CHF, NSTEMI, PAF, CKD  Discharge Date: 10/25/20 RARS: Readmission Risk Score: 18         Spoke with: Julia (dtr/cg)    Taking metoprolol 25mg BID her HR is 70s, but she is dizzy, lightheaded, edema in ankles, no weight gain, BROTHERS, \"can't function at all\". At metoprolol 12.5mg BID her HR was 130-140 but she did not have those symptoms. Reviewed Dr Lopez Brought last note: \"May cut the dose to 12.5mg metoprolol once a day\". Daughter feels this won't be enough to reduce HR and questions why her mother cannot go back to her prior medications (diltiazem and valsartan). She will give metoprolol 12.5mg with evening dose and monitor. Wearing 4.5lpm oxygen with SaO2 79% this morning from bedroom to bathroom to kitchen. Rebounded to 92-94% with DB and rest. Oxygen tubing 25' without connections. Daughter doesn't feel it is processing through her PAP machine. Patient has vv with jos Mcdaniel later this afternoon. Daughter will discuss metoprolol and need for oxygen concentrator up to 10lpm. CTN forwarding this note via chart routing to Dr Smooth Mcdaniel as Allie Cazares. CTN will follow up in 1-2 days.     Care Transitions Subsequent and Final Call    Schedule Follow Up Appointment with PCP:  Completed  Subsequent and Final Calls  Do you have any ongoing symptoms?:  Yes  Onset of Patient-reported symptoms:  Other  Patient-reported symptoms:  Fatigue, Shortness of Breath, Other  Interventions for patient-reported symptoms:  Notified PCP/Physician  Have your medications changed?:  Yes  Do you have any questions related to your medications?:  Yes  Do you currently have any active services?:  Yes  Are you currently active with any services?:  Home Health  Do you have any needs or concerns that I can assist you with?:  Yes  Identified Barriers:  Impairment  Care Transitions Interventions  Other Interventions:             Follow Up  Future Appointments   Date Time Provider Natalio Bueno   11/3/2020  2:40 PM Dorothy Lyons MD AND DONTRELL SPENCER   2/10/2021  1:00 PM MD JOHANNA Krishna AND LILY Rodrigez RN

## 2020-11-03 NOTE — TELEPHONE ENCOUNTER
Per [employer] called and LVM for daughter relaying message. Told to contact the office if any further questions.  TY

## 2020-11-03 NOTE — PROGRESS NOTES
CC-Post-hospitalization follow up    Patient was recently hospitalized at [facility] because of acute respiratory failure with hypoxemia and was found to have acute pulmonary edema along with acute congestive heart failure which was systolic as well as diastolic in nature along with that patient was also found to have proximal atrial fibrillation, patient was given aggressive treatment and patient's heart failure as well as leg swelling had improved, as per patient's family patient was on 7 L of high flow oxygen a minute till day before discharge and patient says subsequently she was on 5 L of nasal cannula oxygen at rest which was decreased to 4 L/min via nasal cannula at the day of discharge which was Sunday which was addressed and patient was not evaluated for supplemental needs for oxygen with ambulation as being told by the patient's family, patient's shortness of breath has decreased, patient continues to take Lasix, patient does not have any increasing cough or expectoration, patient was started on metoprolol by the cardiologist and patient's family states that patient cannot tolerate that and she is having more symptoms with the metoprolol, also they wanted to see if the patient can get a concentrator which goes up to 10 L/min as the current one may not be giving her adequate oxygen per se, patient does not have any chest pain or palpitations, patient has been using her CPAP machine regularly, no other pertinent review of system of concern    Previous  HPI: Patient has come to the office for pulmonary follow-up and patient states that she is doing better and no increasing cough/expectoration/sob/wheezing, patient does not have any profound rhinorrhea or nasal congestion, patient does not have a significant increasing shortness of breath or wheezing, patient does not have any chest pain or palpitations, patient does not have any fever or chills, patient is using her CPAP regular basis, patient does not have any pleuritic chest pain, patient does not have any significant abdominal discomfort and nausea vomiting, patient's usage of CPAP machine is adequate, patient also does not have any increasing leg edema, patient has to use the rescue nebulization rarely ;patient does not have rescue inhaler and does not want one inpsite of being offered overall patient says that she is doing well with no other pertinent review of system of concern       Patient is a 80-year-old female who was admitted to the hospital with acute respiratory insufficiency and was found to have congestive heart failure with pulmonary edema along with that patient had UTI and there was a question that patient may have ILD, patient was given diuresis along with antibiotics and supportive care and patient was discharged home but patient had to be readmitted with a similar issue of UTI and shortness of breath and was found to have pulmonary edema once again and was treated for the same,  says that she was in a bad shape per month when these symptoms happen but over the course of time she has recovered, patient has some cough with mucoid expectoration once in a while, patient does not have any increasing shortness of breath, patient is able to do her ADLs without any worsening shortness of breath, patient does not have any chest pain or palpitations at this time, patient does not have any increasing rhinorrhea or nasal congestion, patient does not have any difficulty in swallowing, no coughing or choking or eating, patient does not have any otalgia or your discharge, patient does not have any pleuritic chest pain, patient still has some leg swelling but 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, patient is on diuretics on a regular basis, patient checks her weights on an everyday basis and is trying not to gain any weight, patient does not have any abdominal discomfort and nausea vomiting, patient does not have any dysuria or hematuria now, patient is using her CPAP machine on a regular basis, patient does not have any small joint pains or swelling of the knuckles, patient does not have any significant confusion or lethargy, patient does not have any change in ambient environment,patient has xopenex with nebulizer which she uses once a while but no rescue inhaler  no other pertinent review of system of concern          ROS same as above     Physical Exam:  There were no vitals taken for this visit.'  Constitutional:  No acute distress. while sitting in chair on nasal cannula oxygenation   HENT:  Oropharynx is clear and moist. No thyromegaly. Mallampati 3  Eyes:  Conjunctivae are normal. Pupils equal, round, and reactive to light. No scleral icterus. Neck: . No tracheal deviation present. No obvious thyroid mass. Cardiovascular: S1s2 irregularly irregular, normal heart sounds. No right ventricular heave. (-) lower extremity edema. Pulmonary/Chest: No wheezes. No significant  rales. Chest wall is not dull to percussion. No accessory muscle usage or stridor. Abdominal: Soft. Bowel sounds present. No distension or hernia. No tenderness. obese  Musculoskeletal: No cyanosis. No clubbing. No obvious joint deformity. Lymphadenopathy: No cervical or supraclavicular adenopathy. Skin: Skin is warm and dry. No rash or nodules on the exposed extremities. Psychiatric: Normal mood and affect. Behavior is normal.  No anxiety. Neurologic: Alert, awake and oriented. PERRL. Speech fluent      Total score: 3           Data:     Imaging:  I have reviewed radiology images personally.   No orders to display         PFT done in 2014 shows patient to have mild obstructive airway disease along with that patient also has reactive airway disease and also patient has profound decrease in diffusion capacity when adjusted for volume    CPAP data was downloaded and patient used the machine on all 30 days and patient's usage has been 100% over the 4 hour limit, patient's average usage was 7 hours and 23 minutes, patient's AHI has come down to 0.5/h      Repeat comprising evaluation shows patient to have used the CPAP machine for all 30 days, patient uses more than 4 hours was 100%, patient's average usage was 6 hours and 49 minutes, patient's IPAP was 10 and EPAP was 4, patient's AHI was 1.2 per hour    Patient was evaluated and patient was 94% on room air breast but after 1 minute of walking patient's oxygen saturations dropped to 85% and had to put on 3.5 L of oxygen to bring the saturation to 92%      Patient is compliance data was reviewed and patient use the CPAP machine on all 30 days and is usage more than 4 hours was 100%, patient's AHI has come down to 2/h    Patient CPAP compliance data was reviewed and patient use the CPAP machine on 27 days out of 30 and patient's usage of CPAP machine for than 4 hours was 83%, patient's AHI has come down to 1.1/h, patient does not having large leaks    ONE XRAY VIEW OF THE CHEST         10/21/2020 11:30 pm         COMPARISON:    Chest July 8, 2018.         HISTORY:    ORDERING SYSTEM PROVIDED HISTORY: Sepsis    TECHNOLOGIST PROVIDED HISTORY:    Reason for exam:->Sepsis    Reason for Exam: Shortness of Breath (started approx 3 days ago. sat per    squad sat was initially 76 placed on oxygen at 10 and hhn x 1 and sat 96)         FINDINGS:    Cardiomegaly with vascular congestion, small bilateral pleural effusions and    bibasilar airspace disease.  No pneumothorax or free air.              Impression    CHF findings with cardiomegaly, vascular congestion and small bilateral    pleural effusions.  Superimposed pneumonia cannot be excluded       ECHO-Summary   LV systolic function is mildly reduced with an estimated EF of 45-50%. Endocardium not entirely well visualized but no obvious segmental wall   motion abnormalities. There is moderate concentric left ventricular hypertrophy.    Elevated left ventricular diastolic filling pressure. Mild mitral annular calcification. The left atrium is at the upper limits of normal in size. Moderate right-sided chamber enlargement. Aortic valve appears sclerotic but opens adequately. Mild mitral regurgitation. Mild-moderate tricuspid regurgitation. Systolic pulmonary artery pressure (SPAP) is estimated at [facility] c/w severe   pulmonary hypertension (RAP of 8 mmHg). Assessment:    1. KATIANA on CPAP      2. Pulmonary HTN ([facility]      3. Hypoxia      4. Acute on chronic combined systolic and diastolic congestive heart failure ([facility] [address]      5.  Chronic obstructive pulmonary disease, unspecified COPD type ([facility] [address]        6. Persistent atrial fibrillation (HCC)          Plan:   · Patient's review of system discussed  · Patient's recent hospitalization was reviewed and discussed with patient and family  · Patient's interventions were discussed  · Patient and family were told about the CPAP compliance report and patient is compliant with the use of CPAP machine and it is efficacious  · Patient was told about salt and fluid restrictions  · Reason to continue diuretics as given by cardiology  · Patient to take Xopenex nebulization on whenever necessary basis  · Patient was given Xopenex HFA inhaler and was told to take 2 puffs every 6 on whenever necessary basis and was taught how to take it properly-does not want to use it  · No need of any antibiotics or prednisone from pulmonary standpoint of view CP  · Patient to continue with her CPAP as before and it is working and she is compliant  · Patient to continue with oxygen supplementation to keep saturation between 90-94% only  · Order for DME company to get an oxygen concentrator because approved 10 L/min via high flow cannula in place  · Patient and family were told to discuss with Dr. Ching(cardiology) about the metoprolol or any alternatives  · Patient family were told about the echocardiogram results which was done in the hospital recently along with implications  · Patient was also told that metoprolol is not a nonspecific beta-blocker as patient's family was worried that he can cause patient to have bronchospasm which may not be the case at this time  · Diet and lifestyle modifications discussed  · Keep negative fluid balance  · Patient's saturation should be between 90 and 69% and the implications of saturation less than 90% or more than 94% discussed  · Further management depending on patient's clinical status and the follow-up on above recommendations and discussion with Dr. Orly Quinteros is a 80 y.o. female evaluated via telephone on 11/3/2020. Consent:  She and/or health care decision maker is aware that that she may receive a bill for this telephone service, depending on her insurance coverage, and has provided verbal consent to proceed: Yes      Documentation:  I communicated with the patient and/or health care decision maker about clinical status /labs/options   Details of this discussion including any medical advice provided:     I affirm this is a Patient Initiated Episode with a Patient who has not had a related appointment within my department in the past 7 days or scheduled within the next 24 hours.     Patient identification was verified at the start of the visit: Yes    Total Time: minutes: 21-30 minutes    Note: not billable if this call serves to triage the patient into an appointment for the relevant concern      [facility]

## 2020-11-04 NOTE — TELEPHONE ENCOUNTER
I did not know she was on metoprolol 25mg BID when I got the message yesterday since it was not listed as such in medications list in epic  It was listed 12.5mg BID so I cut it down to 12.5mg daily  She could try taking 12.5 mg BID for now if does not work out with that let us know.

## 2020-11-04 NOTE — CARE COORDINATION
[facility] Transitions Follow Up Call    2020    Patient: Anusha Gaspar  Patient : 1930   MRN: [id number]  Reason for Admission: respiratory failure, CHF, NSTEMI, PAF, CKD  Discharge Date: 10/25/20 RARS: Readmission Risk Score: 18         Spoke with: Tom Marrufo (dtr/cg)    Daughter continued metoprolol 25mg BID because she doesn't feel the HR will be controlled well at the lower dose of 12.5mg daily as recommended yesterday by Ronna Montana. She discussed with Dr Beltran Mena who directed further discussion with cardiology. Reports BLE edema, weakness, doesn't feel like sitting up. Taking furosemide 60mg in the morning but urine output is not very high per daughter. Limits fluid and salt intake as instructed:    Fluid intake = 2 (8oz) cups coffee in AM, 1 (8 oz) cup milk, 1-16.9 ounce bottle water. Avoids soups, deli meats, added salt, restaurant foods. Pulm ordered oxygen concentrator up to 10lpm with goal 90-94%. They monitor with home pulse ox. Patient reports SOB at rest which makes her want to stay in place. Daughter checked with pulse ox while on call but had difficulty getting it to read - finally got HR 73 and SaO2 90% on 4.5lpm at rest, BP has not been checked yet today and cuff is not handy to check during call. States SN was out yesterday and only visit once/week and they are to provide the home care vitals equipment. Daughter confirms that patient's wishes are to manage medically, avoid invasive testing but have higher quality of life than she is currently and feels the metoprolol need to be re-evaluated. Instructed daughter to find BP cuff, check VS and call cardiology office to request a vv to discuss the above. Provided her with office number. CTN sending note to Dr Evangelina Stuart as additional info.     Follow Up  Future Appointments   Date Time Provider Natalio Bueno   2/10/2021  1:00 PM MD JOHANNA Roblero AND LILY Rodriguez, RN

## 2020-11-04 NOTE — TELEPHONE ENCOUNTER
Called and spoke to pt's daughter. Sts that when [facility] was having pt take Metoprolol 12.5 in the AM and 12.5 in the PM, HR was still high. Then it was increased to 25mg BID, HR was good but pt felt very sick. Pt's daughter wants to know if there may be an alternative medication that wouldn't have the side effects. Daughter is concerned that if taking 12.5mg qd then HR will still go thru the \"roof\". Please advise, thank you.  Pt just feels very sick on the medication

## 2020-11-04 NOTE — TELEPHONE ENCOUNTER
Patient's daughter called stating since starting metoprolol her mother has been lifeless, weak and SOB

## 2020-11-06 NOTE — CARE COORDINATION
Natalio Bueno   11/10/2020  1:00 PM MD Mee Santiago Ra   2/10/2021  1:00 PM MD JOHANNA Mckay AND LILY Scott RN

## 2020-11-10 NOTE — PATIENT INSTRUCTIONS
PLAN:  1. Discussed at length treatment options for AF   - different medication to slow heart rate (atenolol)   - antiarrhythmic medications to help keep heart in rhythm   - AV node ablation with Pacemaker, would be dependant upon the pacemaker for the entirety of your life  2. Start taking Atenolol 25 mg twice daily for tachycardia/AF  3. Stop taking Diltiazem, we are changing this medication  4. Stop taking Valsartan  5. 7 Day Cardiac Event Monitor (mail to patient) to assess for AF  6. Continue other medications as prescribed  7.  Follow up with EP NP in 3 months

## 2020-11-10 NOTE — PROGRESS NOTES
11/10/2020    Primary Care Physician: MD Izabel     TELEHEALTH EVALUATION -- Audio (During [id number] public health emergency)    [place]: She presents today via virtial visit to be evaluated for atrial fibrillation. She was admitted in 2018 for shortness of breath and was found to be in AF with RVR. She was then started on diltiazem and xarelto. She was admitted 10/21-10/25/2020 for shortness of breath and hypoxia. Her EKG on 10/21/20 demonstrated accelerated junctional rhythm with  BPM. She had both elevated serial troponin and BNP. She was started on metoprolol 12.5 mg BID for her tachycardia. She underwent an echocardiogram on 10/22/20 that demonstrated an EF of 45-50% with LA at the upper limits of normal in size, mild MR and mild/mod TR. On 10/27/20 her metoprolol was increased to 25 mg BID due to tachycardia with HR in 130's. On 11/3/20 her metoprolol was decreased to 12.5 mg BID due to dizziness and fatigue. Today 11/10/20 she presents via virtual visit with her Daughter who reports her mother is not tolerating her medications to suppress her tachycardia well. She reports the metoprolol was controlling her heart rate but she had severe dizziness and SOB and diarrhea. Daughter reports she stopped the metoprolol and put her back on the diltiazem and losartan. Daughter reports her mother has been dealing with AF for about 4 years or so. Daughter reports mother does not want to have advanced intervention for her heart disease as she does not want to go through several procedures. She reports she is taking her medications as prescribed and tolerates them well. She denies any abnormal bleeding or bruising. Patient denies current edema, chest pain, sob, palpitations, dizziness or syncope.       Juan Pablo Marshall (:  1930) has requested an audio/video evaluation for the following concern(s):    Chief Complaint   Patient presents with    Atrial Fibrillation    1 Year Follow Up        Review of Systems    Prior to Visit Medications    Medication Sig Taking?  Authorizing Provider   dilTIAZem (DILACOR XR) 240 MG extended release capsule Take 240 mg by mouth daily Yes Historical Provider, MD   valsartan (DIOVAN) 40 MG tablet Take 40 mg by mouth daily Yes Historical Provider, MD   aspirin 81 MG chewable tablet Take 1 tablet by mouth daily Yes Heather Arias MD   atorvastatin (LIPITOR) 20 MG tablet Take 1 tablet by mouth nightly Yes Heather Arias MD   furosemide (LASIX) 40 MG tablet Take 60 mg by mouth daily 1.5 tablets daily Yes Historical Provider, MD   oxybutynin (DITROPAN-XL) 10 MG extended release tablet TAKE 1 TABLET BY MOUTH DAILY Yes PAM Mcknight CNP   clorazepate (TRANXENE) 7.5 MG tablet TAKE 1 TABLET BY MOUTH EVERY NIGHT Yes PAM Mcknight CNP   blood glucose test strips (ONETOUCH ULTRA) strip 1 each by Does not apply route daily DMll, E11.9 Yes PAM Perez CNP   allopurinol (ZYLOPRIM) 100 MG tablet TAKE 1 TABLET BY MOUTH EVERY DAY Yes Kt Sim MD   levalbuterol (Jamee Sas) 1.25 MG/3ML nebulizer solution USE 1 NEBULE QID PRN Yes Kt Sim MD   rivaroxaban (XARELTO) 15 MG TABS tablet TAKE 1 TABLET BY MOUTH EVERY MORNING WITH BREAKFAST Yes Kt Sim MD   LANTUS SOLOSTAR 100 UNIT/ML injection pen ADMINISTER 24 UNITS UNDER THE SKIN TWICE DAILY  Patient taking differently: 20 Units ADMINISTER 24 UNITS UNDER THE SKIN TWICE DAILY Yes Kt Sim MD   Lancet Devices (ONE TOUCH DELICA LANCING DEV) [address] [facility] 1 each by Does not apply route daily Yes Kt Sim MD   Blood Glucose Monitoring Suppl (ONE TOUCH ULTRA MINI) w/Device KIT 1 kit by Does not apply route daily Yes Kt Sim MD   Handicap Placard [address] [facility] by Does not apply route Unable to walk distance, expiration 5 years Yes Kt Sim MD   CALCIUM PO Take 1,200 mg by mouth  Yes Historical Provider, MD   CPAP Machine MISC by Does not apply route nightly Yes Historical Provider, MD metoprolol tartrate (LOPRESSOR) 25 MG tablet Take 0.5 tablets by mouth 2 times daily  Patient not taking: Reported on 2020  Gavino Hayes MD       Social History     Tobacco Use    Smoking status: Former Smoker     Packs/day: 0.50     Years: 30.00     Pack years: 15.00     Types: Cigarettes     Last attempt to quit: 1987     Years since quittin.8    Smokeless tobacco: Never Used   Substance Use Topics    Alcohol use: No    Drug use: No        Allergies   Allergen Reactions    Penicillins    ,   Past Medical History:   Diagnosis Date    COPD (chronic obstructive pulmonary disease) ([facility]     Diabetes mellitus ([facility]     Gout     Hyperlipidemia     Hypertension     Paroxysmal A-fib (HCC)    ,   Past Surgical History:   Procedure Laterality Date    HYSTERECTOMY     ,   Social History     Tobacco Use    Smoking status: Former Smoker     Packs/day: 0.50     Years: 30.00     Pack years: 15.00     Types: Cigarettes     Last attempt to quit: 1987     Years since quittin.8    Smokeless tobacco: Never Used   Substance Use Topics    Alcohol use: No    Drug use: No   , History reviewed. No pertinent family history. ,   Immunization History   Administered Date(s) Administered    Influenza, High Dose (Fluzone 65 yrs and older) 10/01/2014, 10/09/2015, 10/01/2017, 2018, 10/03/2019    Pneumococcal Conjugate 13-valent (Hoover Marcelo) 10/09/2015, 2018    Pneumococcal Polysaccharide ([id number]) 2020   ,   Health Maintenance   Topic Date Due    DTaP/Tdap/Td vaccine (1 - Tdap) 1949    Shingles Vaccine (1 of 2) 1980    Annual Wellness Visit (AWV)  2019    Lipid screen  2021    Potassium monitoring  10/25/2021    Creatinine monitoring  10/25/2021    Flu vaccine  Completed    Pneumococcal 65+ years Vaccine  Completed    Hepatitis A vaccine  Aged Out    Hib vaccine  Aged Out    Meningococcal (ACWY) vaccine  Aged Out       DATA:  ECG: 10/21/20: Accelerated Junctional with  BPM    ECHO: 10/22/20: Summary   LV systolic function is mildly reduced with an estimated EF of 45-50%. Endocardium not entirely well visualized but no obvious segmental wall   motion abnormalities. There is moderate concentric left ventricular hypertrophy. Elevated left ventricular diastolic filling pressure. Mild mitral annular calcification. The left atrium is at the upper limits of normal in size. Moderate right-sided chamber enlargement. Aortic valve appears sclerotic but opens adequately. Mild mitral regurgitation. Mild-moderate tricuspid regurgitation. Systolic pulmonary artery pressure (SPAP) is estimated at [facility] c/w severe   pulmonary hypertension (RAP of 8 mmHg). IMPRESSION:  1. Persistent atrial fibrillation ([id number] score   Patient is an 66-year-old female with a medical history significant for persistent atrial fibrillation, chronic insufficiency stage III, mellitus type II, likely sleep apnea, interstitial lung disease, and new onset systolic heart failure who was interviewed via her daughter because of tachycardia. Unfortunately patient unable to tolerate metoprolol due to worsening shortness of breath and fatigue. She does much better on diltiazem. Ischemic work-up was deferred per patient's preference given her age. Her heart rates are better controlled with diltiazem and I suspect that her heart failure may in fact be secondary to poorly controlled atrial fibrillation however unable to confirm this because of patient not wanting ischemic work-up at this point. We discussed continuation of diltiazem (not ideal), switching to alternative beta-blocker such as atenolol, antiarrhythmic therapy, or pace and ablate strategy. At this point patient does not want invasive therapy so patient at baseline he is active and no. Family would like to start slow and so we decided to start patient on atenolol.   She will stop her verapamil as she gets loaded on atenolol. We will get a heart monitor for 7 days when she started her atenolol and see how her rates are doing. We will have her follow-up with nurse practitioner in 2 months. All questions and concerns addressed. - Stop valsartan and diltiazem. Hopeful will have enough blood pressure to restart valsartan for GDMT. - Start atenolol.  - Seven day monitor.  - Follow up with us in 2 months (NP). 2. Cardiomyopathy. Etiology unclear. Currently being followed by Dr. Alok Blanton. - Follow up with Dr. Alok Blanton and plan per above. PLAN:  1. Discussed at length treatment options for AF   - different medication to slow heart rate (atenolol)   - antiarrhythmic medications to help keep heart in rhythm   - AV node ablation with Pacemaker, would be dependant upon the pacemaker for the entirety of your life  2. Start taking Atenolol 25 mg twice daily for tachycardia/AF  3. Stop taking Diltiazem, we are changing this medication  4. Stop taking Valsartan  5. 7 Day Cardiac Event Monitor (mail to patient) to assess for AF  6. Continue other medications as prescribed  7. Follow up with EP NP in 3 months       This note was scribed in the presence of Velvet Marcos MD by Deborah Leyden. Poncho Farrar RN. Rhina Nelson is a 80 y.o. female being evaluated by a Virtual Visit (audio) encounter to address concerns as mentioned above. A caregiver was present when appropriate. Due to this being a TeleHealth encounter (During [facility] public health emergency), evaluation of the following organ systems was limited: Vitals/Constitutional/EENT/Resp/CV/GI//MS/Neuro/Skin/Heme-Lymph-Imm.   Pursuant to the emergency declaration under the [facility], [address] [place] [address] authority and the [employer] and [employer] Act, this Virtual Visit was conducted with patient's (and/or legal guardian's) consent, to reduce the patient's risk of exposure to COVID-19 and provide necessary medical care. The patient (and/or legal guardian) has also been advised to contact this office for worsening conditions or problems, and seek emergency medical treatment and/or call 911 if deemed necessary. Patient identification was verified at the start of the visit: YES    Total time spent on this encounter: approximately 25 minutes     Services were provided through a video synchronous discussion virtually to substitute for in-person clinic visit. Patient and provider were located at their individual homes. The scribe's documentation has been prepared under my direction and personally reviewed by me in its entirety. I confirm that the note above accurately reflects all work, physical examination, the discussion of treatments and procedures, and medical decision making performed by me. Med Hogue MD personally performed the services described in this documentation as scribed by nurse in my presence, and is both accurate and complete.     Electronically signed by Garo Eng MD on 11/10/2020 at 1:58 PM

## 2020-11-11 NOTE — CARE COORDINATION
[facility] Transitions Follow Up Call    2020    Patient: Ani Gallegos  Patient : 1930   MRN: [id number]  Reason for Admission: respiratory failure, CHF, NSTEMI  Discharge Date: 10/25/20 RARS: Readmission Risk Score: 18         Spoke with: Marcella Craft (dtr)    Completed vv with Xochilt Gray yesterday. Started new med per their conversation. Waiting for heart monitor to arrive by mail. [facility] continues. Reports lightheadedness that wakes her middle of night but BP was WNL. Encouraged snack with HS Lantus that includes protein to prevent drop in BS that may cause this. States she will increase bedtime snack. Has CTN contact for future needs. Care Transitions Subsequent and Final Call    Schedule Follow Up Appointment with PCP:  Completed  Subsequent and Final Calls  Have your medications changed?:  Yes  Do you have any questions related to your medications?:  No  Do you currently have any active services?:  Yes  Are you currently active with any services?:  Home Health  Do you have any needs or concerns that I can assist you with?:  No  Identified Barriers:  Impairment  Care Transitions Interventions  Other Interventions:             Follow Up  Future Appointments   Date Time Provider Natalio Bueno   2/10/2021  1:00 PM MD JOHANNA Gonzales AND LILY Glass RN

## 2020-11-16 NOTE — TELEPHONE ENCOUNTER
Monitor placed by AB RN/ MAIL TO [id number] [facility]  Length of monitor 7 day   Monitor ordered by [id number] [facility],[address]  Serial number n/a  Kit ID n/a MAIL TO PATIENT  Activation successful prior to pt leaving office?  YES

## 2020-11-19 ENCOUNTER — TELEPHONE (OUTPATIENT)
Dept: PRIMARY CARE CLINIC | Age: 85
End: 2020-11-19

## 2020-11-19 ENCOUNTER — TELEPHONE (OUTPATIENT)
Dept: CARDIOLOGY CLINIC | Age: 85
End: 2020-11-19

## 2020-11-19 NOTE — TELEPHONE ENCOUNTER
[facility] office is calling asking if Dr. Rudolph Addison will sign death certificate. Call Kehinde Bond at [phone] and advise.

## 2020-11-19 NOTE — TELEPHONE ENCOUNTER
Pt passed away 11/18. Pt had gotten a monitor in the mail today and daughter wants to know what to do with it. Where does she send it?

## 2020-11-20 NOTE — TELEPHONE ENCOUNTER
[facility] office informed. She will [facility]  home and let them know. They will contact us on delivery of certificate.

## 2024-10-14 NOTE — TELEPHONE ENCOUNTER
Called family. Spoke with . Patient passed away last night. Please help me arrange a note from office to family. Thanks.
Card sent to family.
The monitor was ordered 11/16/2020 to be mailed. This will take a few days to receive the monitor. I will route to Dr. Kathryn Rowe for possible med changes.
Mone Gifford